# Patient Record
Sex: FEMALE | Race: WHITE
[De-identification: names, ages, dates, MRNs, and addresses within clinical notes are randomized per-mention and may not be internally consistent; named-entity substitution may affect disease eponyms.]

---

## 2018-02-26 ENCOUNTER — LABORATORY RESULT (OUTPATIENT)
Age: 33
End: 2018-02-26

## 2018-02-26 PROBLEM — Z00.00 ENCOUNTER FOR PREVENTIVE HEALTH EXAMINATION: Status: ACTIVE | Noted: 2018-02-26

## 2018-02-27 ENCOUNTER — OUTPATIENT (OUTPATIENT)
Dept: OUTPATIENT SERVICES | Facility: HOSPITAL | Age: 33
LOS: 1 days | Discharge: HOME | End: 2018-02-27

## 2018-02-27 ENCOUNTER — APPOINTMENT (OUTPATIENT)
Dept: OBGYN | Facility: CLINIC | Age: 33
End: 2018-02-27
Payer: COMMERCIAL

## 2018-02-27 ENCOUNTER — LABORATORY RESULT (OUTPATIENT)
Age: 33
End: 2018-02-27

## 2018-02-27 VITALS — HEIGHT: 58 IN | BODY MASS INDEX: 28.34 KG/M2 | WEIGHT: 135 LBS

## 2018-02-27 DIAGNOSIS — N91.2 AMENORRHEA, UNSPECIFIED: ICD-10-CM

## 2018-02-27 LAB
BILIRUB UR QL STRIP: NORMAL
CLARITY UR: CLEAR
GLUCOSE UR-MCNC: NORMAL
HCG UR QL: NORMAL EU/DL
HGB UR QL STRIP.AUTO: NORMAL
KETONES UR-MCNC: NORMAL
LEUKOCYTE ESTERASE UR QL STRIP: NORMAL
NITRITE UR QL STRIP: NORMAL
PH UR STRIP: 7
PROT UR STRIP-MCNC: NORMAL
SP GR UR STRIP: 1

## 2018-02-27 PROCEDURE — 76830 TRANSVAGINAL US NON-OB: CPT

## 2018-02-27 PROCEDURE — 81003 URINALYSIS AUTO W/O SCOPE: CPT | Mod: QW

## 2018-02-27 PROCEDURE — 99213 OFFICE O/P EST LOW 20 MIN: CPT | Mod: 25

## 2018-03-08 ENCOUNTER — APPOINTMENT (OUTPATIENT)
Dept: OBGYN | Facility: CLINIC | Age: 33
End: 2018-03-08
Payer: COMMERCIAL

## 2018-03-08 VITALS — BODY MASS INDEX: 28.34 KG/M2 | HEIGHT: 58 IN | WEIGHT: 135 LBS

## 2018-03-08 PROCEDURE — 76830 TRANSVAGINAL US NON-OB: CPT

## 2018-03-08 PROCEDURE — 99213 OFFICE O/P EST LOW 20 MIN: CPT | Mod: 25

## 2018-04-09 ENCOUNTER — APPOINTMENT (OUTPATIENT)
Dept: OBGYN | Facility: CLINIC | Age: 33
End: 2018-04-09
Payer: COMMERCIAL

## 2018-04-09 ENCOUNTER — NON-APPOINTMENT (OUTPATIENT)
Age: 33
End: 2018-04-09

## 2018-04-09 VITALS — WEIGHT: 141 LBS | BODY MASS INDEX: 29.6 KG/M2 | HEIGHT: 58 IN

## 2018-04-09 LAB
BILIRUB UR QL STRIP: NORMAL
CLARITY UR: CLEAR
GLUCOSE UR-MCNC: NORMAL
HCG UR QL: NORMAL EU/DL
HGB UR QL STRIP.AUTO: NORMAL
KETONES UR-MCNC: NORMAL
LEUKOCYTE ESTERASE UR QL STRIP: NORMAL
NITRITE UR QL STRIP: NORMAL
PH UR STRIP: NORMAL
PROT UR STRIP-MCNC: NORMAL
SP GR UR STRIP: NORMAL

## 2018-04-09 PROCEDURE — 0502F SUBSEQUENT PRENATAL CARE: CPT

## 2018-04-14 ENCOUNTER — LABORATORY RESULT (OUTPATIENT)
Age: 33
End: 2018-04-14

## 2018-04-14 ENCOUNTER — OUTPATIENT (OUTPATIENT)
Dept: OUTPATIENT SERVICES | Facility: HOSPITAL | Age: 33
LOS: 1 days | Discharge: HOME | End: 2018-04-14

## 2018-04-14 DIAGNOSIS — Z34.00 ENCOUNTER FOR SUPERVISION OF NORMAL FIRST PREGNANCY, UNSPECIFIED TRIMESTER: ICD-10-CM

## 2018-04-16 ENCOUNTER — APPOINTMENT (OUTPATIENT)
Dept: ANTEPARTUM | Facility: CLINIC | Age: 33
End: 2018-04-16

## 2018-04-16 ENCOUNTER — OUTPATIENT (OUTPATIENT)
Dept: OUTPATIENT SERVICES | Facility: HOSPITAL | Age: 33
LOS: 1 days | Discharge: HOME | End: 2018-04-16

## 2018-04-24 ENCOUNTER — NON-APPOINTMENT (OUTPATIENT)
Age: 33
End: 2018-04-24

## 2018-04-30 ENCOUNTER — TRANSCRIPTION ENCOUNTER (OUTPATIENT)
Age: 33
End: 2018-04-30

## 2018-04-30 DIAGNOSIS — Z36.82 ENCOUNTER FOR ANTENATAL SCREENING FOR NUCHAL TRANSLUCENCY: ICD-10-CM

## 2018-05-02 ENCOUNTER — NON-APPOINTMENT (OUTPATIENT)
Age: 33
End: 2018-05-02

## 2018-05-02 ENCOUNTER — APPOINTMENT (OUTPATIENT)
Dept: OBGYN | Facility: CLINIC | Age: 33
End: 2018-05-02
Payer: COMMERCIAL

## 2018-05-02 VITALS — HEIGHT: 58 IN | BODY MASS INDEX: 30.02 KG/M2 | WEIGHT: 143 LBS

## 2018-05-02 PROCEDURE — 0502F SUBSEQUENT PRENATAL CARE: CPT

## 2018-05-10 ENCOUNTER — OUTPATIENT (OUTPATIENT)
Dept: OUTPATIENT SERVICES | Facility: HOSPITAL | Age: 33
LOS: 1 days | Discharge: HOME | End: 2018-05-10

## 2018-05-10 DIAGNOSIS — Z34.00 ENCOUNTER FOR SUPERVISION OF NORMAL FIRST PREGNANCY, UNSPECIFIED TRIMESTER: ICD-10-CM

## 2018-05-14 ENCOUNTER — NON-APPOINTMENT (OUTPATIENT)
Age: 33
End: 2018-05-14

## 2018-05-17 ENCOUNTER — NON-APPOINTMENT (OUTPATIENT)
Age: 33
End: 2018-05-17

## 2018-05-17 ENCOUNTER — APPOINTMENT (OUTPATIENT)
Dept: OBGYN | Facility: CLINIC | Age: 33
End: 2018-05-17
Payer: COMMERCIAL

## 2018-05-17 VITALS — SYSTOLIC BLOOD PRESSURE: 126 MMHG | WEIGHT: 143 LBS | BODY MASS INDEX: 29.89 KG/M2 | DIASTOLIC BLOOD PRESSURE: 76 MMHG

## 2018-05-17 LAB
GLUCOSE UR-MCNC: NORMAL
HGB UR QL STRIP.AUTO: NORMAL
KETONES UR-MCNC: NORMAL
LEUKOCYTE ESTERASE UR QL STRIP: NORMAL
NITRITE UR QL STRIP: NORMAL
PROT UR STRIP-MCNC: NORMAL

## 2018-05-17 PROCEDURE — 99213 OFFICE O/P EST LOW 20 MIN: CPT

## 2018-06-04 ENCOUNTER — APPOINTMENT (OUTPATIENT)
Dept: ANTEPARTUM | Facility: CLINIC | Age: 33
End: 2018-06-04

## 2018-06-04 ENCOUNTER — OUTPATIENT (OUTPATIENT)
Dept: OUTPATIENT SERVICES | Facility: HOSPITAL | Age: 33
LOS: 1 days | Discharge: HOME | End: 2018-06-04

## 2018-06-05 ENCOUNTER — NON-APPOINTMENT (OUTPATIENT)
Age: 33
End: 2018-06-05

## 2018-06-05 DIAGNOSIS — O35.8XX1 MATERNAL CARE FOR OTHER (SUSPECTED) FETAL ABNORMALITY AND DAMAGE, FETUS 1: ICD-10-CM

## 2018-06-05 DIAGNOSIS — Z3A.19 19 WEEKS GESTATION OF PREGNANCY: ICD-10-CM

## 2018-06-05 DIAGNOSIS — Z03.73 ENCOUNTER FOR SUSPECTED FETAL ANOMALY RULED OUT: ICD-10-CM

## 2018-06-07 ENCOUNTER — NON-APPOINTMENT (OUTPATIENT)
Age: 33
End: 2018-06-07

## 2018-06-07 ENCOUNTER — APPOINTMENT (OUTPATIENT)
Dept: OBGYN | Facility: CLINIC | Age: 33
End: 2018-06-07
Payer: COMMERCIAL

## 2018-06-07 VITALS — BODY MASS INDEX: 31.49 KG/M2 | WEIGHT: 150 LBS | HEIGHT: 58 IN

## 2018-06-07 PROCEDURE — 0502F SUBSEQUENT PRENATAL CARE: CPT

## 2018-06-27 ENCOUNTER — NON-APPOINTMENT (OUTPATIENT)
Age: 33
End: 2018-06-27

## 2018-06-27 ENCOUNTER — APPOINTMENT (OUTPATIENT)
Dept: OBGYN | Facility: CLINIC | Age: 33
End: 2018-06-27
Payer: COMMERCIAL

## 2018-06-27 VITALS — WEIGHT: 155 LBS | SYSTOLIC BLOOD PRESSURE: 110 MMHG | BODY MASS INDEX: 32.4 KG/M2 | DIASTOLIC BLOOD PRESSURE: 70 MMHG

## 2018-06-27 PROCEDURE — 0502F SUBSEQUENT PRENATAL CARE: CPT

## 2018-07-09 ENCOUNTER — LABORATORY RESULT (OUTPATIENT)
Age: 33
End: 2018-07-09

## 2018-07-09 ENCOUNTER — OUTPATIENT (OUTPATIENT)
Dept: OUTPATIENT SERVICES | Facility: HOSPITAL | Age: 33
LOS: 1 days | Discharge: HOME | End: 2018-07-09

## 2018-07-09 DIAGNOSIS — Z34.00 ENCOUNTER FOR SUPERVISION OF NORMAL FIRST PREGNANCY, UNSPECIFIED TRIMESTER: ICD-10-CM

## 2018-07-11 ENCOUNTER — NON-APPOINTMENT (OUTPATIENT)
Age: 33
End: 2018-07-11

## 2018-07-23 ENCOUNTER — APPOINTMENT (OUTPATIENT)
Dept: OBGYN | Facility: CLINIC | Age: 33
End: 2018-07-23
Payer: COMMERCIAL

## 2018-07-23 VITALS — HEIGHT: 58 IN | WEIGHT: 164 LBS | BODY MASS INDEX: 34.43 KG/M2

## 2018-07-23 PROCEDURE — 0502F SUBSEQUENT PRENATAL CARE: CPT

## 2018-08-11 ENCOUNTER — NON-APPOINTMENT (OUTPATIENT)
Age: 33
End: 2018-08-11

## 2018-08-15 ENCOUNTER — APPOINTMENT (OUTPATIENT)
Dept: OBGYN | Facility: CLINIC | Age: 33
End: 2018-08-15
Payer: COMMERCIAL

## 2018-08-15 ENCOUNTER — NON-APPOINTMENT (OUTPATIENT)
Age: 33
End: 2018-08-15

## 2018-08-15 VITALS — BODY MASS INDEX: 35.95 KG/M2 | DIASTOLIC BLOOD PRESSURE: 66 MMHG | WEIGHT: 172 LBS | SYSTOLIC BLOOD PRESSURE: 120 MMHG

## 2018-08-15 PROCEDURE — 0502F SUBSEQUENT PRENATAL CARE: CPT

## 2018-08-30 ENCOUNTER — NON-APPOINTMENT (OUTPATIENT)
Age: 33
End: 2018-08-30

## 2018-08-30 ENCOUNTER — APPOINTMENT (OUTPATIENT)
Dept: OBGYN | Facility: CLINIC | Age: 33
End: 2018-08-30
Payer: COMMERCIAL

## 2018-08-30 PROCEDURE — 76818 FETAL BIOPHYS PROFILE W/NST: CPT

## 2018-08-31 ENCOUNTER — OUTPATIENT (OUTPATIENT)
Dept: OUTPATIENT SERVICES | Facility: HOSPITAL | Age: 33
LOS: 1 days | Discharge: HOME | End: 2018-08-31

## 2018-08-31 VITALS — HEART RATE: 90 BPM | DIASTOLIC BLOOD PRESSURE: 69 MMHG | SYSTOLIC BLOOD PRESSURE: 129 MMHG

## 2018-08-31 VITALS — SYSTOLIC BLOOD PRESSURE: 133 MMHG | HEART RATE: 102 BPM | DIASTOLIC BLOOD PRESSURE: 67 MMHG

## 2018-08-31 DIAGNOSIS — Z98.82 BREAST IMPLANT STATUS: Chronic | ICD-10-CM

## 2018-08-31 LAB
APTT BLD: 26.5 SEC — LOW (ref 27–39.2)
FIBRINOGEN PPP-MCNC: >700 MG/DL — HIGH (ref 204.4–570.6)
HCT VFR BLD CALC: 30.4 % — LOW (ref 37–47)
HGB BLD-MCNC: 10.5 G/DL — LOW (ref 12–16)
INR BLD: 0.94 RATIO — SIGNIFICANT CHANGE UP (ref 0.65–1.3)
MCHC RBC-ENTMCNC: 31.1 PG — HIGH (ref 27–31)
MCHC RBC-ENTMCNC: 34.5 G/DL — SIGNIFICANT CHANGE UP (ref 32–37)
MCV RBC AUTO: 89.9 FL — SIGNIFICANT CHANGE UP (ref 81–99)
NRBC # BLD: 0 /100 WBCS — SIGNIFICANT CHANGE UP (ref 0–0)
PLATELET # BLD AUTO: 247 K/UL — SIGNIFICANT CHANGE UP (ref 130–400)
PROTHROM AB SERPL-ACNC: 10.2 SEC — SIGNIFICANT CHANGE UP (ref 9.95–12.87)
RBC # BLD: 3.38 M/UL — LOW (ref 4.2–5.4)
RBC # FLD: 13.1 % — SIGNIFICANT CHANGE UP (ref 11.5–14.5)
WBC # BLD: 10.22 K/UL — SIGNIFICANT CHANGE UP (ref 4.8–10.8)
WBC # FLD AUTO: 10.22 K/UL — SIGNIFICANT CHANGE UP (ref 4.8–10.8)

## 2018-08-31 RX ORDER — SODIUM CHLORIDE 9 MG/ML
1000 INJECTION, SOLUTION INTRAVENOUS
Qty: 0 | Refills: 0 | Status: DISCONTINUED | OUTPATIENT
Start: 2018-08-31 | End: 2018-09-15
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
## 2018-08-31 NOTE — OB PROVIDER TRIAGE NOTE - ADDITIONAL INSTRUCTIONS
If you have contractions every few minutes, vaginal bleeding, leakage of fluid or you don't feel the baby move please call your doctor or come to labor and delivery.
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
## 2018-08-31 NOTE — OB PROVIDER TRIAGE NOTE - NSHPPHYSICALEXAM_GEN_ALL_CORE
Vital Signs Last 24 Hrs  T(C): 36.4 (31 Aug 2018 10:22), Max: 36.4 (31 Aug 2018 10:22)  T(F): 97.6 (31 Aug 2018 10:22), Max: 97.6 (31 Aug 2018 10:22)  HR: 102 (31 Aug 2018 10:22) (102 - 102)  BP: 133/67 (31 Aug 2018 10:22) (133/67 - 133/67)  RR: 18 (31 Aug 2018 10:22) (18 - 18)    GEN: NAD, AAOX3  abd: soft, gravid, nontender, no r/g/r, no palpable ctx  EFM: 140/mod/accels+  toco: no ctx  SVE:  speculum: Vital Signs Last 24 Hrs  T(C): 36.4 (31 Aug 2018 10:22), Max: 36.4 (31 Aug 2018 10:22)  T(F): 97.6 (31 Aug 2018 10:22), Max: 97.6 (31 Aug 2018 10:22)  HR: 102 (31 Aug 2018 10:22) (102 - 102)  BP: 133/67 (31 Aug 2018 10:22) (133/67 - 133/67)  RR: 18 (31 Aug 2018 10:22) (18 - 18)    GEN: NAD, AAOX3  abd: soft, gravid, nontender, no r/g/r, no palpable ctx  EFM: 140/mod/accels+  toco: occasional ctx  SVE: c/l/p  speculum: closed cervix, no bleeding or discharge

## 2018-08-31 NOTE — OB PROVIDER TRIAGE NOTE - NSHPLABSRESULTS_GEN_ALL_CORE
Hbs Ag NR  RPR NR  varicella immune  rubella immune  B positive, antibody screen negative  HIV NR  GCT 90    Sonograms:  8/30/18 - breech, posterior placenta, MVP 4 cm, EFW 5-11  19w2d - cephalic, 3VC, posterior placenta, MVP 58mm,  grams, normal anatomy  12w2d - viable IUP

## 2018-08-31 NOTE — OB PROVIDER TRIAGE NOTE - NS_LMP_OBGYN_ALL_OB_DT
20-Jan-2018 Complex Repair And Single Advancement Flap Text: The defect edges were debeveled with a #15 scalpel blade.  The primary defect was closed partially with a complex linear closure.  Given the location of the remaining defect, shape of the defect and the proximity to free margins a single advancement flap was deemed most appropriate for complete closure of the defect.  Using a sterile surgical marker, an appropriate advancement flap was drawn incorporating the defect and placing the expected incisions within the relaxed skin tension lines where possible.    The area thus outlined was incised deep to adipose tissue with a #15 scalpel blade.  The skin margins were undermined to an appropriate distance in all directions utilizing iris scissors.

## 2018-09-05 ENCOUNTER — NON-APPOINTMENT (OUTPATIENT)
Age: 33
End: 2018-09-05

## 2018-09-05 ENCOUNTER — APPOINTMENT (OUTPATIENT)
Dept: OBGYN | Facility: CLINIC | Age: 33
End: 2018-09-05
Payer: COMMERCIAL

## 2018-09-05 VITALS — HEIGHT: 58 IN | WEIGHT: 179 LBS | BODY MASS INDEX: 37.57 KG/M2

## 2018-09-05 PROBLEM — D64.9 ANEMIA, UNSPECIFIED: Chronic | Status: ACTIVE | Noted: 2018-08-31

## 2018-09-05 PROCEDURE — 0502F SUBSEQUENT PRENATAL CARE: CPT

## 2018-09-17 ENCOUNTER — APPOINTMENT (OUTPATIENT)
Dept: OBGYN | Facility: CLINIC | Age: 33
End: 2018-09-17
Payer: COMMERCIAL

## 2018-09-17 ENCOUNTER — NON-APPOINTMENT (OUTPATIENT)
Age: 33
End: 2018-09-17

## 2018-09-17 VITALS — WEIGHT: 178 LBS | SYSTOLIC BLOOD PRESSURE: 120 MMHG | BODY MASS INDEX: 37.2 KG/M2 | DIASTOLIC BLOOD PRESSURE: 78 MMHG

## 2018-09-17 PROCEDURE — 0502F SUBSEQUENT PRENATAL CARE: CPT

## 2018-09-24 ENCOUNTER — APPOINTMENT (OUTPATIENT)
Dept: OBGYN | Facility: CLINIC | Age: 33
End: 2018-09-24
Payer: COMMERCIAL

## 2018-09-24 ENCOUNTER — OUTPATIENT (OUTPATIENT)
Dept: OUTPATIENT SERVICES | Facility: HOSPITAL | Age: 33
LOS: 1 days | Discharge: HOME | End: 2018-09-24

## 2018-09-24 ENCOUNTER — NON-APPOINTMENT (OUTPATIENT)
Age: 33
End: 2018-09-24

## 2018-09-24 VITALS — WEIGHT: 178 LBS | BODY MASS INDEX: 37.36 KG/M2 | HEIGHT: 58 IN

## 2018-09-24 DIAGNOSIS — Z98.82 BREAST IMPLANT STATUS: Chronic | ICD-10-CM

## 2018-09-24 DIAGNOSIS — Z34.00 ENCOUNTER FOR SUPERVISION OF NORMAL FIRST PREGNANCY, UNSPECIFIED TRIMESTER: ICD-10-CM

## 2018-09-24 PROCEDURE — 0502F SUBSEQUENT PRENATAL CARE: CPT

## 2018-09-26 LAB
GROUP B BETA STREP DNA (PCR): DETECTED
GROUP B BETA STREP INTERPRETATION: SIGNIFICANT CHANGE UP
SOURCE GROUP B STREP: SIGNIFICANT CHANGE UP

## 2018-09-28 ENCOUNTER — NON-APPOINTMENT (OUTPATIENT)
Age: 33
End: 2018-09-28

## 2018-09-28 ENCOUNTER — APPOINTMENT (OUTPATIENT)
Dept: OBGYN | Facility: CLINIC | Age: 33
End: 2018-09-28
Payer: COMMERCIAL

## 2018-09-28 PROCEDURE — 93975 VASCULAR STUDY: CPT

## 2018-09-28 PROCEDURE — 76819 FETAL BIOPHYS PROFIL W/O NST: CPT

## 2018-09-29 LAB
-  CLINDAMYCIN: SIGNIFICANT CHANGE UP
-  PENICILLIN: SIGNIFICANT CHANGE UP
-  VANCOMYCIN: SIGNIFICANT CHANGE UP
BILIRUB UR QL STRIP: NORMAL
CLARITY UR: CLEAR
GLUCOSE UR-MCNC: NORMAL
HCG UR QL: NORMAL EU/DL
HGB UR QL STRIP.AUTO: NORMAL
KETONES UR-MCNC: NORMAL
LEUKOCYTE ESTERASE UR QL STRIP: NORMAL
METHOD TYPE: SIGNIFICANT CHANGE UP
NITRITE UR QL STRIP: NORMAL
ORGANISM # SPEC MICROSCOPIC CNT: SIGNIFICANT CHANGE UP
ORGANISM # SPEC MICROSCOPIC CNT: SIGNIFICANT CHANGE UP
PH UR STRIP: 7
PROT UR STRIP-MCNC: NORMAL
SP GR UR STRIP: 1.01
SPECIMEN SOURCE: SIGNIFICANT CHANGE UP

## 2018-10-03 ENCOUNTER — NON-APPOINTMENT (OUTPATIENT)
Age: 33
End: 2018-10-03

## 2018-10-03 ENCOUNTER — APPOINTMENT (OUTPATIENT)
Dept: OBGYN | Facility: CLINIC | Age: 33
End: 2018-10-03
Payer: COMMERCIAL

## 2018-10-03 VITALS — SYSTOLIC BLOOD PRESSURE: 130 MMHG | BODY MASS INDEX: 38.25 KG/M2 | WEIGHT: 183 LBS | DIASTOLIC BLOOD PRESSURE: 90 MMHG

## 2018-10-03 PROCEDURE — 0502F SUBSEQUENT PRENATAL CARE: CPT

## 2018-10-04 ENCOUNTER — OUTPATIENT (OUTPATIENT)
Dept: OUTPATIENT SERVICES | Facility: HOSPITAL | Age: 33
LOS: 1 days | Discharge: HOME | End: 2018-10-04

## 2018-10-04 VITALS — HEART RATE: 91 BPM | DIASTOLIC BLOOD PRESSURE: 88 MMHG | SYSTOLIC BLOOD PRESSURE: 142 MMHG

## 2018-10-04 VITALS — DIASTOLIC BLOOD PRESSURE: 53 MMHG | HEART RATE: 81 BPM | SYSTOLIC BLOOD PRESSURE: 101 MMHG

## 2018-10-04 DIAGNOSIS — Z98.82 BREAST IMPLANT STATUS: Chronic | ICD-10-CM

## 2018-10-04 LAB
ALBUMIN SERPL ELPH-MCNC: 3.8 G/DL — SIGNIFICANT CHANGE UP (ref 3.5–5.2)
ALP SERPL-CCNC: 109 U/L — SIGNIFICANT CHANGE UP (ref 30–115)
ALT FLD-CCNC: 12 U/L — SIGNIFICANT CHANGE UP (ref 0–41)
ANION GAP SERPL CALC-SCNC: 18 MMOL/L — HIGH (ref 7–14)
APPEARANCE UR: CLEAR — SIGNIFICANT CHANGE UP
AST SERPL-CCNC: 16 U/L — SIGNIFICANT CHANGE UP (ref 0–41)
BILIRUB SERPL-MCNC: <0.2 MG/DL — SIGNIFICANT CHANGE UP (ref 0.2–1.2)
BILIRUB UR-MCNC: NEGATIVE — SIGNIFICANT CHANGE UP
BLD GP AB SCN SERPL QL: SIGNIFICANT CHANGE UP
BUN SERPL-MCNC: 11 MG/DL — SIGNIFICANT CHANGE UP (ref 10–20)
CALCIUM SERPL-MCNC: 9.7 MG/DL — SIGNIFICANT CHANGE UP (ref 8.5–10.1)
CHLORIDE SERPL-SCNC: 99 MMOL/L — SIGNIFICANT CHANGE UP (ref 98–110)
CO2 SERPL-SCNC: 20 MMOL/L — SIGNIFICANT CHANGE UP (ref 17–32)
COLOR SPEC: YELLOW — SIGNIFICANT CHANGE UP
CREAT ?TM UR-MCNC: 35 MG/DL — SIGNIFICANT CHANGE UP
CREAT SERPL-MCNC: 0.5 MG/DL — LOW (ref 0.7–1.5)
DIFF PNL FLD: NEGATIVE — SIGNIFICANT CHANGE UP
GLUCOSE SERPL-MCNC: 83 MG/DL — SIGNIFICANT CHANGE UP (ref 70–99)
GLUCOSE UR QL: NEGATIVE — SIGNIFICANT CHANGE UP
HCT VFR BLD CALC: 31.3 % — LOW (ref 37–47)
HGB BLD-MCNC: 10.8 G/DL — LOW (ref 12–16)
KETONES UR-MCNC: NEGATIVE — SIGNIFICANT CHANGE UP
LDH SERPL L TO P-CCNC: 196 — SIGNIFICANT CHANGE UP (ref 50–242)
LEUKOCYTE ESTERASE UR-ACNC: NEGATIVE — SIGNIFICANT CHANGE UP
MCHC RBC-ENTMCNC: 31 PG — SIGNIFICANT CHANGE UP (ref 27–31)
MCHC RBC-ENTMCNC: 34.5 G/DL — SIGNIFICANT CHANGE UP (ref 32–37)
MCV RBC AUTO: 89.9 FL — SIGNIFICANT CHANGE UP (ref 81–99)
NITRITE UR-MCNC: NEGATIVE — SIGNIFICANT CHANGE UP
NRBC # BLD: 0 /100 WBCS — SIGNIFICANT CHANGE UP (ref 0–0)
PH UR: 6.5 — SIGNIFICANT CHANGE UP (ref 5–8)
PLATELET # BLD AUTO: 207 K/UL — SIGNIFICANT CHANGE UP (ref 130–400)
POTASSIUM SERPL-MCNC: 4 MMOL/L — SIGNIFICANT CHANGE UP (ref 3.5–5)
POTASSIUM SERPL-SCNC: 4 MMOL/L — SIGNIFICANT CHANGE UP (ref 3.5–5)
PROT ?TM UR-MCNC: <5 MG/DLG/24H — SIGNIFICANT CHANGE UP
PROT SERPL-MCNC: 6.1 G/DL — SIGNIFICANT CHANGE UP (ref 6–8)
PROT UR-MCNC: NEGATIVE — SIGNIFICANT CHANGE UP
PROT/CREAT UR-RTO: <0.1 RATIO — SIGNIFICANT CHANGE UP (ref 0–0.2)
RBC # BLD: 3.48 M/UL — LOW (ref 4.2–5.4)
RBC # FLD: 13.2 % — SIGNIFICANT CHANGE UP (ref 11.5–14.5)
SODIUM SERPL-SCNC: 137 MMOL/L — SIGNIFICANT CHANGE UP (ref 135–146)
SP GR SPEC: <=1.005 — SIGNIFICANT CHANGE UP (ref 1.01–1.03)
TYPE + AB SCN PNL BLD: SIGNIFICANT CHANGE UP
URATE SERPL-MCNC: 5.2 MG/DL — SIGNIFICANT CHANGE UP (ref 2.5–7)
UROBILINOGEN FLD QL: 0.2 — SIGNIFICANT CHANGE UP (ref 0.2–0.2)
WBC # BLD: 9.66 K/UL — SIGNIFICANT CHANGE UP (ref 4.8–10.8)
WBC # FLD AUTO: 9.66 K/UL — SIGNIFICANT CHANGE UP (ref 4.8–10.8)

## 2018-10-04 RX ORDER — SODIUM CHLORIDE 9 MG/ML
3 INJECTION INTRAMUSCULAR; INTRAVENOUS; SUBCUTANEOUS EVERY 8 HOURS
Qty: 0 | Refills: 0 | Status: DISCONTINUED | OUTPATIENT
Start: 2018-10-04 | End: 2018-10-19

## 2018-10-04 NOTE — OB PROVIDER TRIAGE NOTE - NSHPPHYSICALEXAM_GEN_ALL_CORE
PHYSICAL EXAM:  T(F): 98.4 (10-04 @ 06:08)  HR: 96 (10-04 @ 06:52)  BP: 131/73 (10-04 @ 06:52)  RR: 16 (10-04 @ 06:08)  Constitutional: AAOx3, NAD  Abdomen: Soft, gravid, nontender, no palpable ctx  EFM: 130, mod yina, +accel  [place]: q1-2mins, irritable  SVE: C/L/P  Udip: neg  EFW: 3200gm

## 2018-10-04 NOTE — OB PROVIDER TRIAGE NOTE - PMH
Anemia    Ectopic pregnancy, unspecified location, unspecified whether intrauterine pregnancy present  tx with methotrexate

## 2018-10-04 NOTE — OB PROVIDER TRIAGE NOTE - HISTORY OF PRESENT ILLNESS
33  at 37w2d by LMP c/w first trimester sono presents for eval of elevated BP. Had /80 at office yesterday, and then at home took it again 140/80, then 150/80, then came in. She denies ctx, lof, or vb. Feels good fetal movement. Denies hematuria, dysuria, constipation, diarrhea, nausea, vomiting, chest pain, or SOB. Denies headache, visual changes, shortness of breath, chest pain, right upper quadrant pain, nausea, or vomiting. Uncomplicated pregnancy, last examined yesterday and was closed.     ObHx: ectopic tx with methotrexate x1  GynHx: Denies history of STIs, abnormal pap, ovarian cysts, or fibroids

## 2018-10-04 NOTE — OB PROVIDER TRIAGE NOTE - ADDITIONAL INSTRUCTIONS
- DC home per PMD  - F/u Uprcr  - Preeclamptic instructions given  - F/u with OBGYN at next appt   - Labor precautions given
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
## 2018-10-04 NOTE — PROGRESS NOTE ADULT - SUBJECTIVE AND OBJECTIVE BOX
PGY 3 Note:    Patient seen at bedside feeling well at this time. Denies headache, vision changes, chest pain, shortness of breath, right upper or epigastric abdominal pain, new swelling. Denies ctx, vb, lof. Good FM.    Vital Signs Last 24 Hrs  T(C): 36.9 (04 Oct 2018 06:08), Max: 36.9 (04 Oct 2018 06:08)  T(F): 98.4 (04 Oct 2018 06:08), Max: 98.4 (04 Oct 2018 06:08)  HR: 81 (04 Oct 2018 10:19) (75 - 96)  BP: 101/53 (04 Oct 2018 10:19) (94/51 - 142/88)  RR: 16 (04 Oct 2018 06:08) (16 - 16)    Gen: NAD, A&OX3  Heart: S1S2, RRR  Lungs: CTABL  Abd: Gravid, soft, NT, no palpable ctx  SVE: Deferred, last exam C/L/P  Ext: No edema of calf tenderness  Neuro: DTRs 2+ UE BL    Labs:                          10.8   9.66  )-----------( 207      ( 04 Oct 2018 06:56 )             31.3   10-04    137  |  99  |  11  ----------------------------<  83  4.0   |  20  |  0.5<L>    Ca    9.7      04 Oct 2018 06:56    TPro  6.1  /  Alb  3.8  /  TBili  <0.2  /  DBili  x   /  AST  16  /  ALT  12  /  AlkPhos  109  10-04    Lactate Dehydrogenase, Serum: 196 (10.04.18 @ 06:56)    Uric Acid, Serum: 5.2 mg/dL (10.04.18 @ 06:56)    Urinalysis Basic - ( 04 Oct 2018 06:56 )    Color: Yellow / Appearance: Clear / SG: <=1.005 / pH: x  Gluc: x / Ketone: Negative  / Bili: Negative / Urobili: 0.2   Blood: x / Protein: Negative / Nitrite: Negative   Leuk Esterase: Negative / RBC: x / WBC x   Sq Epi: x / Non Sq Epi: x / Bacteria: x    Type + Screen: B POS (10.04.18 @ 06:56)    Uprcr pending    MEDICATIONS  (STANDING):    MEDICATIONS  (PRN):  sodium chloride 0.9% lock flush 3 milliLiter(s) IV Push every 8 hours PRN keep it open      BPP 8/8 PGY 3 Note:    Patient seen at bedside feeling well at this time. Denies headache, vision changes, chest pain, shortness of breath, right upper or epigastric abdominal pain, new swelling. Denies ctx, vb, lof. Good FM.    Vital Signs Last 24 Hrs  T(C): 36.9 (04 Oct 2018 06:08), Max: 36.9 (04 Oct 2018 06:08)  T(F): 98.4 (04 Oct 2018 06:08), Max: 98.4 (04 Oct 2018 06:08)  HR: 81 (04 Oct 2018 10:19) (75 - 96)  BP: 101/53 (04 Oct 2018 10:19) (94/51 - 142/88)  RR: 16 (04 Oct 2018 06:08) (16 - 16)    Gen: NAD, A&OX3  Heart: S1S2, RRR  Lungs: CTABL  Abd: Gravid, soft, NT, no palpable ctx, no RUQ/epigastric tenderness  SVE: Deferred, last exam C/L/P  Ext: No edema of calf tenderness  Neuro: DTRs 2+ UE BL  EFM: 140/mod/+accels  Rena Lara: Irritable, not palpated or felt by patient    Labs:                          10.8   9.66  )-----------( 207      ( 04 Oct 2018 06:56 )             31.3   10-04    137  |  99  |  11  ----------------------------<  83  4.0   |  20  |  0.5<L>    Ca    9.7      04 Oct 2018 06:56    TPro  6.1  /  Alb  3.8  /  TBili  <0.2  /  DBili  x   /  AST  16  /  ALT  12  /  AlkPhos  109  10-04    Lactate Dehydrogenase, Serum: 196 (10.04.18 @ 06:56)    Uric Acid, Serum: 5.2 mg/dL (10.04.18 @ 06:56)    Urinalysis Basic - ( 04 Oct 2018 06:56 )    Color: Yellow / Appearance: Clear / SG: <=1.005 / pH: x  Gluc: x / Ketone: Negative  / Bili: Negative / Urobili: 0.2   Blood: x / Protein: Negative / Nitrite: Negative   Leuk Esterase: Negative / RBC: x / WBC x   Sq Epi: x / Non Sq Epi: x / Bacteria: x    Type + Screen: B POS (10.04.18 @ 06:56)    Uprcr pending    MEDICATIONS  (STANDING):    MEDICATIONS  (PRN):  sodium chloride 0.9% lock flush 3 milliLiter(s) IV Push every 8 hours PRN keep it open      BPP 8/8

## 2018-10-08 ENCOUNTER — APPOINTMENT (OUTPATIENT)
Dept: OBGYN | Facility: CLINIC | Age: 33
End: 2018-10-08
Payer: COMMERCIAL

## 2018-10-08 VITALS — BODY MASS INDEX: 38.62 KG/M2 | WEIGHT: 184 LBS | HEIGHT: 58 IN

## 2018-10-08 PROCEDURE — 0502F SUBSEQUENT PRENATAL CARE: CPT

## 2018-10-13 ENCOUNTER — NON-APPOINTMENT (OUTPATIENT)
Age: 33
End: 2018-10-13

## 2018-10-15 ENCOUNTER — NON-APPOINTMENT (OUTPATIENT)
Age: 33
End: 2018-10-15

## 2018-10-15 ENCOUNTER — APPOINTMENT (OUTPATIENT)
Dept: OBGYN | Facility: CLINIC | Age: 33
End: 2018-10-15
Payer: COMMERCIAL

## 2018-10-15 VITALS — DIASTOLIC BLOOD PRESSURE: 70 MMHG | WEIGHT: 184 LBS | SYSTOLIC BLOOD PRESSURE: 126 MMHG | BODY MASS INDEX: 38.46 KG/M2

## 2018-10-15 PROCEDURE — 0502F SUBSEQUENT PRENATAL CARE: CPT

## 2018-10-22 ENCOUNTER — OUTPATIENT (OUTPATIENT)
Dept: OUTPATIENT SERVICES | Facility: HOSPITAL | Age: 33
LOS: 1 days | Discharge: HOME | End: 2018-10-22

## 2018-10-22 ENCOUNTER — APPOINTMENT (OUTPATIENT)
Dept: OBGYN | Facility: CLINIC | Age: 33
End: 2018-10-22

## 2018-10-22 DIAGNOSIS — Z01.818 ENCOUNTER FOR OTHER PREPROCEDURAL EXAMINATION: ICD-10-CM

## 2018-10-22 DIAGNOSIS — Z98.82 BREAST IMPLANT STATUS: Chronic | ICD-10-CM

## 2018-10-22 PROBLEM — O00.90 UNSPECIFIED ECTOPIC PREGNANCY WITHOUT INTRAUTERINE PREGNANCY: Chronic | Status: ACTIVE | Noted: 2018-10-04

## 2018-10-22 LAB
ALBUMIN SERPL ELPH-MCNC: 3.6 G/DL — SIGNIFICANT CHANGE UP (ref 3.5–5.2)
ALP SERPL-CCNC: 134 U/L — HIGH (ref 30–115)
ALT FLD-CCNC: 14 U/L — SIGNIFICANT CHANGE UP (ref 0–41)
ANION GAP SERPL CALC-SCNC: 13 MMOL/L — SIGNIFICANT CHANGE UP (ref 7–14)
APPEARANCE UR: CLEAR — SIGNIFICANT CHANGE UP
APTT BLD: 27.2 SEC — SIGNIFICANT CHANGE UP (ref 27–39.2)
AST SERPL-CCNC: 18 U/L — SIGNIFICANT CHANGE UP (ref 0–41)
BASOPHILS # BLD AUTO: 0.05 K/UL — SIGNIFICANT CHANGE UP (ref 0–0.2)
BASOPHILS NFR BLD AUTO: 0.6 % — SIGNIFICANT CHANGE UP (ref 0–1)
BILIRUB SERPL-MCNC: <0.2 MG/DL — SIGNIFICANT CHANGE UP (ref 0.2–1.2)
BILIRUB UR-MCNC: NEGATIVE — SIGNIFICANT CHANGE UP
BLD GP AB SCN SERPL QL: SIGNIFICANT CHANGE UP
BUN SERPL-MCNC: 10 MG/DL — SIGNIFICANT CHANGE UP (ref 10–20)
CALCIUM SERPL-MCNC: 9.9 MG/DL — SIGNIFICANT CHANGE UP (ref 8.5–10.1)
CHLORIDE SERPL-SCNC: 96 MMOL/L — LOW (ref 98–110)
CO2 SERPL-SCNC: 23 MMOL/L — SIGNIFICANT CHANGE UP (ref 17–32)
COLOR SPEC: YELLOW — SIGNIFICANT CHANGE UP
CREAT SERPL-MCNC: 0.6 MG/DL — LOW (ref 0.7–1.5)
DIFF PNL FLD: NEGATIVE — SIGNIFICANT CHANGE UP
EOSINOPHIL # BLD AUTO: 0.2 K/UL — SIGNIFICANT CHANGE UP (ref 0–0.7)
EOSINOPHIL NFR BLD AUTO: 2.4 % — SIGNIFICANT CHANGE UP (ref 0–8)
GLUCOSE SERPL-MCNC: 85 MG/DL — SIGNIFICANT CHANGE UP (ref 70–99)
GLUCOSE UR QL: NEGATIVE — SIGNIFICANT CHANGE UP
HCT VFR BLD CALC: 32.8 % — LOW (ref 37–47)
HGB BLD-MCNC: 11.3 G/DL — LOW (ref 12–16)
IMM GRANULOCYTES NFR BLD AUTO: 1.7 % — HIGH (ref 0.1–0.3)
INR BLD: 0.82 RATIO — SIGNIFICANT CHANGE UP (ref 0.65–1.3)
KETONES UR-MCNC: NEGATIVE — SIGNIFICANT CHANGE UP
LEUKOCYTE ESTERASE UR-ACNC: NEGATIVE — SIGNIFICANT CHANGE UP
LYMPHOCYTES # BLD AUTO: 1.7 K/UL — SIGNIFICANT CHANGE UP (ref 1.2–3.4)
LYMPHOCYTES # BLD AUTO: 20.6 % — SIGNIFICANT CHANGE UP (ref 20.5–51.1)
MCHC RBC-ENTMCNC: 31.4 PG — HIGH (ref 27–31)
MCHC RBC-ENTMCNC: 34.5 G/DL — SIGNIFICANT CHANGE UP (ref 32–37)
MCV RBC AUTO: 91.1 FL — SIGNIFICANT CHANGE UP (ref 81–99)
MONOCYTES # BLD AUTO: 0.48 K/UL — SIGNIFICANT CHANGE UP (ref 0.1–0.6)
MONOCYTES NFR BLD AUTO: 5.8 % — SIGNIFICANT CHANGE UP (ref 1.7–9.3)
NEUTROPHILS # BLD AUTO: 5.68 K/UL — SIGNIFICANT CHANGE UP (ref 1.4–6.5)
NEUTROPHILS NFR BLD AUTO: 68.9 % — SIGNIFICANT CHANGE UP (ref 42.2–75.2)
NITRITE UR-MCNC: NEGATIVE — SIGNIFICANT CHANGE UP
NRBC # BLD: 0 /100 WBCS — SIGNIFICANT CHANGE UP (ref 0–0)
PH UR: 7 — SIGNIFICANT CHANGE UP (ref 5–8)
PLATELET # BLD AUTO: 200 K/UL — SIGNIFICANT CHANGE UP (ref 130–400)
POTASSIUM SERPL-MCNC: 4.1 MMOL/L — SIGNIFICANT CHANGE UP (ref 3.5–5)
POTASSIUM SERPL-SCNC: 4.1 MMOL/L — SIGNIFICANT CHANGE UP (ref 3.5–5)
PROT SERPL-MCNC: 6.4 G/DL — SIGNIFICANT CHANGE UP (ref 6–8)
PROT UR-MCNC: NEGATIVE — SIGNIFICANT CHANGE UP
PROTHROM AB SERPL-ACNC: 9.5 SEC — LOW (ref 9.95–12.87)
RBC # BLD: 3.6 M/UL — LOW (ref 4.2–5.4)
RBC # FLD: 13.8 % — SIGNIFICANT CHANGE UP (ref 11.5–14.5)
SODIUM SERPL-SCNC: 132 MMOL/L — LOW (ref 135–146)
SP GR SPEC: 1.02 — SIGNIFICANT CHANGE UP (ref 1.01–1.03)
TYPE + AB SCN PNL BLD: SIGNIFICANT CHANGE UP
UROBILINOGEN FLD QL: 0.2 — SIGNIFICANT CHANGE UP (ref 0.2–0.2)
WBC # BLD: 8.25 K/UL — SIGNIFICANT CHANGE UP (ref 4.8–10.8)
WBC # FLD AUTO: 8.25 K/UL — SIGNIFICANT CHANGE UP (ref 4.8–10.8)

## 2018-10-23 ENCOUNTER — RX RENEWAL (OUTPATIENT)
Age: 33
End: 2018-10-23

## 2018-10-23 ENCOUNTER — INPATIENT (INPATIENT)
Facility: HOSPITAL | Age: 33
LOS: 2 days | Discharge: HOME | End: 2018-10-26
Attending: OBSTETRICS & GYNECOLOGY | Admitting: OBSTETRICS & GYNECOLOGY
Payer: COMMERCIAL

## 2018-10-23 ENCOUNTER — APPOINTMENT (OUTPATIENT)
Dept: OBGYN | Facility: HOSPITAL | Age: 33
End: 2018-10-23

## 2018-10-23 VITALS — HEART RATE: 101 BPM | SYSTOLIC BLOOD PRESSURE: 134 MMHG | DIASTOLIC BLOOD PRESSURE: 85 MMHG

## 2018-10-23 DIAGNOSIS — Z98.82 BREAST IMPLANT STATUS: Chronic | ICD-10-CM

## 2018-10-23 DIAGNOSIS — Z90.89 ACQUIRED ABSENCE OF OTHER ORGANS: Chronic | ICD-10-CM

## 2018-10-23 LAB
HIV 1 & 2 AB SERPL IA.RAPID: SIGNIFICANT CHANGE UP
T PALLIDUM AB TITR SER: NEGATIVE — SIGNIFICANT CHANGE UP

## 2018-10-23 PROCEDURE — 59510 CESAREAN DELIVERY: CPT | Mod: U9

## 2018-10-23 RX ORDER — DOCUSATE SODIUM 100 MG
100 CAPSULE ORAL
Qty: 0 | Refills: 0 | Status: DISCONTINUED | OUTPATIENT
Start: 2018-10-23 | End: 2018-10-23

## 2018-10-23 RX ORDER — OXYTOCIN 10 UNIT/ML
41.67 VIAL (ML) INJECTION
Qty: 20 | Refills: 0 | Status: DISCONTINUED | OUTPATIENT
Start: 2018-10-23 | End: 2018-10-26

## 2018-10-23 RX ORDER — DIPHENHYDRAMINE HCL 50 MG
25 CAPSULE ORAL EVERY 6 HOURS
Qty: 0 | Refills: 0 | Status: DISCONTINUED | OUTPATIENT
Start: 2018-10-23 | End: 2018-10-26

## 2018-10-23 RX ORDER — VALACYCLOVIR 500 MG/1
500 TABLET, FILM COATED ORAL TWICE DAILY
Qty: 60 | Refills: 0 | Status: ACTIVE | COMMUNITY
Start: 2018-09-25 | End: 1900-01-01

## 2018-10-23 RX ORDER — FERROUS SULFATE 325(65) MG
325 TABLET ORAL DAILY
Qty: 0 | Refills: 0 | Status: DISCONTINUED | OUTPATIENT
Start: 2018-10-23 | End: 2018-10-26

## 2018-10-23 RX ORDER — SODIUM CHLORIDE 9 MG/ML
1000 INJECTION, SOLUTION INTRAVENOUS
Qty: 0 | Refills: 0 | Status: DISCONTINUED | OUTPATIENT
Start: 2018-10-23 | End: 2018-10-23

## 2018-10-23 RX ORDER — OXYCODONE AND ACETAMINOPHEN 5; 325 MG/1; MG/1
1 TABLET ORAL
Qty: 0 | Refills: 0 | Status: DISCONTINUED | OUTPATIENT
Start: 2018-10-23 | End: 2018-10-26

## 2018-10-23 RX ORDER — IBUPROFEN 200 MG
600 TABLET ORAL EVERY 6 HOURS
Qty: 0 | Refills: 0 | Status: DISCONTINUED | OUTPATIENT
Start: 2018-10-23 | End: 2018-10-26

## 2018-10-23 RX ORDER — KETOROLAC TROMETHAMINE 30 MG/ML
30 SYRINGE (ML) INJECTION ONCE
Qty: 0 | Refills: 0 | Status: DISCONTINUED | OUTPATIENT
Start: 2018-10-23 | End: 2018-10-23

## 2018-10-23 RX ORDER — SODIUM CHLORIDE 9 MG/ML
1000 INJECTION, SOLUTION INTRAVENOUS
Qty: 0 | Refills: 0 | Status: DISCONTINUED | OUTPATIENT
Start: 2018-10-23 | End: 2018-10-24

## 2018-10-23 RX ORDER — NALOXONE HYDROCHLORIDE 4 MG/.1ML
0.1 SPRAY NASAL
Qty: 0 | Refills: 0 | Status: DISCONTINUED | OUTPATIENT
Start: 2018-10-23 | End: 2018-10-26

## 2018-10-23 RX ORDER — SODIUM CHLORIDE 9 MG/ML
300 INJECTION, SOLUTION INTRAVENOUS ONCE
Qty: 0 | Refills: 0 | Status: DISCONTINUED | OUTPATIENT
Start: 2018-10-23 | End: 2018-10-23

## 2018-10-23 RX ORDER — DOCUSATE SODIUM 100 MG
100 CAPSULE ORAL
Qty: 0 | Refills: 0 | Status: DISCONTINUED | OUTPATIENT
Start: 2018-10-23 | End: 2018-10-26

## 2018-10-23 RX ORDER — BUTORPHANOL TARTRATE 2 MG/ML
2 INJECTION, SOLUTION INTRAMUSCULAR; INTRAVENOUS ONCE
Qty: 0 | Refills: 0 | Status: DISCONTINUED | OUTPATIENT
Start: 2018-10-23 | End: 2018-10-26

## 2018-10-23 RX ORDER — GENTAMICIN SULFATE 40 MG/ML
80 VIAL (ML) INJECTION EVERY 8 HOURS
Qty: 0 | Refills: 0 | Status: COMPLETED | OUTPATIENT
Start: 2018-10-23 | End: 2018-10-24

## 2018-10-23 RX ORDER — CITRIC ACID/SODIUM CITRATE 300-500 MG
15 SOLUTION, ORAL ORAL EVERY 4 HOURS
Qty: 0 | Refills: 0 | Status: DISCONTINUED | OUTPATIENT
Start: 2018-10-23 | End: 2018-10-23

## 2018-10-23 RX ORDER — SIMETHICONE 80 MG/1
80 TABLET, CHEWABLE ORAL EVERY 4 HOURS
Qty: 0 | Refills: 0 | Status: DISCONTINUED | OUTPATIENT
Start: 2018-10-23 | End: 2018-10-26

## 2018-10-23 RX ORDER — OXYCODONE AND ACETAMINOPHEN 5; 325 MG/1; MG/1
2 TABLET ORAL EVERY 6 HOURS
Qty: 0 | Refills: 0 | Status: DISCONTINUED | OUTPATIENT
Start: 2018-10-23 | End: 2018-10-26

## 2018-10-23 RX ORDER — OXYTOCIN 10 UNIT/ML
333.33 VIAL (ML) INJECTION
Qty: 20 | Refills: 0 | Status: DISCONTINUED | OUTPATIENT
Start: 2018-10-23 | End: 2018-10-23

## 2018-10-23 RX ORDER — LANOLIN
1 OINTMENT (GRAM) TOPICAL
Qty: 0 | Refills: 0 | Status: DISCONTINUED | OUTPATIENT
Start: 2018-10-23 | End: 2018-10-26

## 2018-10-23 RX ORDER — ONDANSETRON 8 MG/1
4 TABLET, FILM COATED ORAL EVERY 6 HOURS
Qty: 0 | Refills: 0 | Status: DISCONTINUED | OUTPATIENT
Start: 2018-10-23 | End: 2018-10-26

## 2018-10-23 RX ORDER — GENTAMICIN SULFATE 40 MG/ML
80 VIAL (ML) INJECTION ONCE
Qty: 0 | Refills: 0 | Status: COMPLETED | OUTPATIENT
Start: 2018-10-23 | End: 2018-10-23

## 2018-10-23 RX ORDER — ENOXAPARIN SODIUM 100 MG/ML
40 INJECTION SUBCUTANEOUS EVERY 24 HOURS
Qty: 0 | Refills: 0 | Status: DISCONTINUED | OUTPATIENT
Start: 2018-10-23 | End: 2018-10-26

## 2018-10-23 RX ORDER — MORPHINE SULFATE 50 MG/1
3 CAPSULE, EXTENDED RELEASE ORAL ONCE
Qty: 0 | Refills: 0 | Status: DISCONTINUED | OUTPATIENT
Start: 2018-10-23 | End: 2018-10-26

## 2018-10-23 RX ADMIN — ENOXAPARIN SODIUM 40 MILLIGRAM(S): 100 INJECTION SUBCUTANEOUS at 22:45

## 2018-10-23 RX ADMIN — Medication 100 MILLIGRAM(S): at 18:11

## 2018-10-23 RX ADMIN — Medication 100 MILLIGRAM(S): at 08:07

## 2018-10-23 RX ADMIN — Medication 30 MILLIGRAM(S): at 11:45

## 2018-10-23 RX ADMIN — Medication 100 MILLIGRAM(S): at 07:44

## 2018-10-23 NOTE — OB PROVIDER H&P - HISTORY OF PRESENT ILLNESS
33y.o.  @ 40wks by LMP presents for primary  for breech presentation. Pt denies ctx, ROM, VB And states good FM.

## 2018-10-23 NOTE — OB PROVIDER H&P - NSHPPHYSICALEXAM_GEN_ALL_CORE
HR: 101 (10-23-18 @ 06:58) (101 - 101)  BP: 134/85 (10-23-18 @ 06:58) (134/85 - 134/85)    Abd: gravid, soft  VE deferred  Ctx: none noted  FH: 130 moderate variability, +accels, Cat I

## 2018-10-23 NOTE — PACU DISCHARGE NOTE - COMMENTS
No anesthesia complications  Awake and alert  Airway:Patent  Pain:0/10  BP:123/59  HR:85  RR:16  Temp:97.4  SpO2:98%  PONV:None
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
## 2018-10-23 NOTE — PRE-ANESTHESIA EVALUATION ADULT - NSANTHOSAYNRD_GEN_A_CORE
No. JACKY screening performed.  STOP BANG Legend: 0-2 = LOW Risk; 3-4 = INTERMEDIATE Risk; 5-8 = HIGH Risk

## 2018-10-23 NOTE — OB PROVIDER H&P - NS_OBGYNHISTORY_OBGYN_ALL_OB_FT
OBHx: Ectopic tx w/ MTX 2011  GYNHx: +HPV w/ nl PAP- s/p colpo, subsequent PAP WNL, Pt has +HSV 2 serum but has never had outbreak

## 2018-10-23 NOTE — BRIEF OPERATIVE NOTE - OPERATION/FINDINGS
live female infant weighing 4090g (9lb) with APGARs 9/9 delivered in incomplete breech presentation. Normal appearing uterus, B/L tubes/ovaries.

## 2018-10-23 NOTE — PROGRESS NOTE ADULT - ASSESSMENT
A/P: S/P primary C/S for breech presentation, POD0, recovering well   - UO adequate, d/c thomas as per duramorph at 0058 if remains adequate  - advance diet as tolerated to clears   - pain mgmt prn   - simethicone/colace  - encourage incentive spirometer use   - monitor vitals/bleeding   - dressing change in PM   - routine postoperative care A/P: S/P primary C/S for breech presentation, POD0, recovering well   - UO adequate, d/c thomas as per duramorph at 0058 if remains adequate  - advance diet as tolerated to clears   - pain mgmt prn   - simethicone/colace  - encourage incentive spirometer use   - monitor vitals/bleeding   - dressing change in PM   - f/u AM CBC   - routine postoperative care

## 2018-10-24 LAB
BASOPHILS # BLD AUTO: 0.05 K/UL — SIGNIFICANT CHANGE UP (ref 0–0.2)
BASOPHILS NFR BLD AUTO: 0.4 % — SIGNIFICANT CHANGE UP (ref 0–1)
EOSINOPHIL # BLD AUTO: 0.08 K/UL — SIGNIFICANT CHANGE UP (ref 0–0.7)
EOSINOPHIL NFR BLD AUTO: 0.7 % — SIGNIFICANT CHANGE UP (ref 0–8)
HCT VFR BLD CALC: 27.5 % — LOW (ref 37–47)
HGB BLD-MCNC: 9.4 G/DL — LOW (ref 12–16)
IMM GRANULOCYTES NFR BLD AUTO: 0.8 % — HIGH (ref 0.1–0.3)
LYMPHOCYTES # BLD AUTO: 1.96 K/UL — SIGNIFICANT CHANGE UP (ref 1.2–3.4)
LYMPHOCYTES # BLD AUTO: 16.9 % — LOW (ref 20.5–51.1)
MCHC RBC-ENTMCNC: 31.5 PG — HIGH (ref 27–31)
MCHC RBC-ENTMCNC: 34.2 G/DL — SIGNIFICANT CHANGE UP (ref 32–37)
MCV RBC AUTO: 92.3 FL — SIGNIFICANT CHANGE UP (ref 81–99)
MONOCYTES # BLD AUTO: 0.66 K/UL — HIGH (ref 0.1–0.6)
MONOCYTES NFR BLD AUTO: 5.7 % — SIGNIFICANT CHANGE UP (ref 1.7–9.3)
NEUTROPHILS # BLD AUTO: 8.77 K/UL — HIGH (ref 1.4–6.5)
NEUTROPHILS NFR BLD AUTO: 75.5 % — HIGH (ref 42.2–75.2)
NRBC # BLD: 0 /100 WBCS — SIGNIFICANT CHANGE UP (ref 0–0)
PLATELET # BLD AUTO: 156 K/UL — SIGNIFICANT CHANGE UP (ref 130–400)
RBC # BLD: 2.98 M/UL — LOW (ref 4.2–5.4)
RBC # FLD: 14 % — SIGNIFICANT CHANGE UP (ref 11.5–14.5)
WBC # BLD: 11.61 K/UL — HIGH (ref 4.8–10.8)
WBC # FLD AUTO: 11.61 K/UL — HIGH (ref 4.8–10.8)

## 2018-10-24 RX ADMIN — Medication 600 MILLIGRAM(S): at 11:07

## 2018-10-24 RX ADMIN — SIMETHICONE 80 MILLIGRAM(S): 80 TABLET, CHEWABLE ORAL at 11:07

## 2018-10-24 RX ADMIN — Medication 100 MILLIGRAM(S): at 02:11

## 2018-10-24 RX ADMIN — Medication 100 MILLIGRAM(S): at 06:39

## 2018-10-24 RX ADMIN — SIMETHICONE 80 MILLIGRAM(S): 80 TABLET, CHEWABLE ORAL at 21:46

## 2018-10-24 RX ADMIN — OXYCODONE AND ACETAMINOPHEN 1 TABLET(S): 5; 325 TABLET ORAL at 21:47

## 2018-10-24 RX ADMIN — Medication 100 MILLIGRAM(S): at 03:40

## 2018-10-24 RX ADMIN — Medication 325 MILLIGRAM(S): at 10:49

## 2018-10-24 RX ADMIN — ENOXAPARIN SODIUM 40 MILLIGRAM(S): 100 INJECTION SUBCUTANEOUS at 21:46

## 2018-10-24 RX ADMIN — Medication 1 TABLET(S): at 10:49

## 2018-10-24 RX ADMIN — SIMETHICONE 80 MILLIGRAM(S): 80 TABLET, CHEWABLE ORAL at 06:39

## 2018-10-24 RX ADMIN — Medication 600 MILLIGRAM(S): at 17:09

## 2018-10-24 RX ADMIN — Medication 100 MILLIGRAM(S): at 17:10

## 2018-10-24 RX ADMIN — Medication 600 MILLIGRAM(S): at 05:06
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
## 2018-10-24 NOTE — PROGRESS NOTE ADULT - ASSESSMENT
A/P: S/P primary C/S for breech presentation, POD1, recovering well   - clear liquids, advance as tolerated   - pain mgmt prn   - simethicone/colace  - encourage incentive spirometer use  - encourage ambulation, PO hydration  - monitor vitals, bleeding   - routine postoperative care

## 2018-10-25 RX ORDER — TETANUS TOXOID, REDUCED DIPHTHERIA TOXOID AND ACELLULAR PERTUSSIS VACCINE, ADSORBED 5; 2.5; 8; 8; 2.5 [IU]/.5ML; [IU]/.5ML; UG/.5ML; UG/.5ML; UG/.5ML
0.5 SUSPENSION INTRAMUSCULAR ONCE
Qty: 0 | Refills: 0 | Status: COMPLETED | OUTPATIENT
Start: 2018-10-25 | End: 2018-10-25

## 2018-10-25 RX ORDER — INFLUENZA VIRUS VACCINE 15; 15; 15; 15 UG/.5ML; UG/.5ML; UG/.5ML; UG/.5ML
0.5 SUSPENSION INTRAMUSCULAR ONCE
Qty: 0 | Refills: 0 | Status: COMPLETED | OUTPATIENT
Start: 2018-10-25 | End: 2018-10-26

## 2018-10-25 RX ADMIN — Medication 100 MILLIGRAM(S): at 06:42

## 2018-10-25 RX ADMIN — Medication 600 MILLIGRAM(S): at 11:56

## 2018-10-25 RX ADMIN — Medication 325 MILLIGRAM(S): at 11:55

## 2018-10-25 RX ADMIN — ENOXAPARIN SODIUM 40 MILLIGRAM(S): 100 INJECTION SUBCUTANEOUS at 22:28

## 2018-10-25 RX ADMIN — Medication 100 MILLIGRAM(S): at 17:15

## 2018-10-25 RX ADMIN — TETANUS TOXOID, REDUCED DIPHTHERIA TOXOID AND ACELLULAR PERTUSSIS VACCINE, ADSORBED 0.5 MILLILITER(S): 5; 2.5; 8; 8; 2.5 SUSPENSION INTRAMUSCULAR at 16:53

## 2018-10-25 RX ADMIN — OXYCODONE AND ACETAMINOPHEN 2 TABLET(S): 5; 325 TABLET ORAL at 22:29

## 2018-10-25 RX ADMIN — Medication 600 MILLIGRAM(S): at 04:26

## 2018-10-25 RX ADMIN — Medication 1 TABLET(S): at 11:55

## 2018-10-25 RX ADMIN — Medication 600 MILLIGRAM(S): at 17:15

## 2018-10-25 NOTE — PROGRESS NOTE ADULT - ASSESSMENT
33 year old female status post primary  section for breech, now post op day # 2  Afebrile and vs stable.  Ambulatory. Voiding without complaints.  tolerating regular diet.  Passing rectal flatus/no bowel movement yet. not uncomfortable.  Nursing without complaints.    pe; abdomen: soft, minimal tympany, incision healing well        lochia light.  no calf tenderness.    imp: p/op primary  section day # 2 doing well    plan; d/c iv lock;  order tdap for patient as per request          probable discharge tomorrow.

## 2018-10-25 NOTE — PROGRESS NOTE ADULT - ASSESSMENT
A/P: S/P primary C/S for breech presentation, POD2, recovering well   - encourage ambulation, PO hydration  - continue with regular diet   - pain mgmt prn   - monitor vital signs, bleeding   - routine postoperative care   - anticipate d/c home tomorrow

## 2018-10-26 ENCOUNTER — TRANSCRIPTION ENCOUNTER (OUTPATIENT)
Age: 33
End: 2018-10-26

## 2018-10-26 VITALS
SYSTOLIC BLOOD PRESSURE: 128 MMHG | HEART RATE: 89 BPM | RESPIRATION RATE: 19 BRPM | DIASTOLIC BLOOD PRESSURE: 72 MMHG | TEMPERATURE: 97 F

## 2018-10-26 RX ORDER — VALACYCLOVIR 500 MG/1
500 TABLET, FILM COATED ORAL
Qty: 0 | Refills: 0 | COMMUNITY

## 2018-10-26 RX ORDER — IBUPROFEN 200 MG
1 TABLET ORAL
Qty: 0 | Refills: 0 | DISCHARGE
Start: 2018-10-26

## 2018-10-26 RX ORDER — DOCUSATE SODIUM 100 MG
1 CAPSULE ORAL
Qty: 60 | Refills: 0
Start: 2018-10-26 | End: 2018-11-24

## 2018-10-26 RX ORDER — FERROUS SULFATE 325(65) MG
0 TABLET ORAL
Qty: 0 | Refills: 0 | COMMUNITY

## 2018-10-26 RX ADMIN — Medication 600 MILLIGRAM(S): at 10:51

## 2018-10-26 RX ADMIN — Medication 100 MILLIGRAM(S): at 05:49

## 2018-10-26 RX ADMIN — Medication 600 MILLIGRAM(S): at 04:56

## 2018-10-26 RX ADMIN — INFLUENZA VIRUS VACCINE 0.5 MILLILITER(S): 15; 15; 15; 15 SUSPENSION INTRAMUSCULAR at 10:48

## 2018-10-26 NOTE — DISCHARGE NOTE OB - PATIENT PORTAL LINK FT
You can access the [facility] Patient Portal, offered by [facility], by registering with the following website: [email or web address]
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
## 2018-10-26 NOTE — DISCHARGE NOTE OB - CARE PROVIDER_API CALL
Hiro Hernandez), Obstetrics and Gynecology  [address] [place] [address]  Phone: [phone]  Fax: [phone]

## 2018-10-26 NOTE — DISCHARGE NOTE OB - MEDICATION SUMMARY - MEDICATIONS TO TAKE
I will START or STAY ON the medications listed below when I get home from the hospital:    ibuprofen 600 mg oral tablet  -- 1 tab(s) by mouth every 6 hours, As needed, Mild Pain (1 - 3)  -- Indication: For pain    Percocet 5/325 oral tablet  -- 1 tab(s) by mouth every 6 hours MDD:4 as needed  -- Caution federal law prohibits the transfer of this drug to any person other  than the person for whom it was prescribed.  May cause drowsiness.  Alcohol may intensify this effect.  Use care when operating dangerous machinery.  This prescription cannot be refilled.  This product contains acetaminophen.  Do not use  with any other product containing acetaminophen to prevent possible liver damage.  Using more of this medication than prescribed may cause serious breathing problems.    -- Indication: For Severe pain    Colace 100 mg oral capsule  -- 1 cap(s) by mouth 1 to 2 times a day   -- Medication should be taken with plenty of water.    -- Indication: For Constipation

## 2018-10-26 NOTE — DISCHARGE NOTE OB - CARE PLAN
Principal Discharge DX:	Delivery by  section for breech presentation  Goal:	healthy mother and baby  Assessment and plan of treatment:	Nothing in the vagina for 6 weeks (no sex, no tampons, no douching, no swimming pools). Avoid tub baths, you may shower.    If you have a fever of 100.4F or greater, severe vaginal bleeding, or severe abdominal pain, call your Ob/Gyn or come to the emergency department immediately. Principal Discharge DX:	Delivery by  section for breech presentation  Goal:	healthy mother and baby  Assessment and plan of treatment:	Nothing in the vagina for 6 weeks (no sex, no tampons, no douching, no swimming pools). Avoid tub baths, you may shower.  No heavy lifting, incision care.    If you have a fever of 100.4F or greater, severe vaginal bleeding, or severe abdominal pain, call your Ob/Gyn or come to the emergency department immediately.

## 2018-10-26 NOTE — PROGRESS NOTE ADULT - ASSESSMENT
34yo s/p pLTCS for breech presentation, POD #3, Doing well     Plan:  - Routine postop care  - Advance to regular diet  - Encourage ambulation, IS use, PO hydration and breastfeeding  - Discharge home today, instructions discussed

## 2018-10-26 NOTE — PROGRESS NOTE ADULT - ASSESSMENT
A/P: S/P primary C/S for breech presentation, POD3, recovering well  - 1 isolated elevated BP yesterday, no criteria met for gHTN   - monitor vital signs, bleeding  - encourage ambulation, PO hydration  - anticipate d/c home today  - f/u in 1 week for incision check, 6 weeks for PP visit. A/P: S/P primary C/S for breech presentation, POD3, recovering well  - 1 isolated elevated BP yesterday, no criteria met for gHTN   - monitor vital signs, bleeding  - encourage ambulation, PO hydration  - anticipate d/c home today  - f/u in 1 week for incision check, 6 weeks for PP visit.   - scripts sent to pharmacy

## 2018-10-26 NOTE — DISCHARGE NOTE OB - PLAN OF CARE
healthy mother and baby Nothing in the vagina for 6 weeks (no sex, no tampons, no douching, no swimming pools). Avoid tub baths, you may shower.    If you have a fever of 100.4F or greater, severe vaginal bleeding, or severe abdominal pain, call your Ob/Gyn or come to the emergency department immediately. Nothing in the vagina for 6 weeks (no sex, no tampons, no douching, no swimming pools). Avoid tub baths, you may shower.  No heavy lifting, incision care.    If you have a fever of 100.4F or greater, severe vaginal bleeding, or severe abdominal pain, call your Ob/Gyn or come to the emergency department immediately.

## 2018-11-01 DIAGNOSIS — R03.0 ELEVATED BLOOD-PRESSURE READING, WITHOUT DIAGNOSIS OF HYPERTENSION: ICD-10-CM

## 2018-11-01 DIAGNOSIS — Z3A.40 40 WEEKS GESTATION OF PREGNANCY: ICD-10-CM

## 2018-11-01 DIAGNOSIS — Z87.891 PERSONAL HISTORY OF NICOTINE DEPENDENCE: ICD-10-CM

## 2018-11-01 DIAGNOSIS — D64.9 ANEMIA, UNSPECIFIED: ICD-10-CM

## 2018-11-01 DIAGNOSIS — Z33.1 PREGNANT STATE, INCIDENTAL: ICD-10-CM

## 2018-11-01 DIAGNOSIS — Z88.0 ALLERGY STATUS TO PENICILLIN: ICD-10-CM

## 2018-11-01 DIAGNOSIS — Z88.2 ALLERGY STATUS TO SULFONAMIDES: ICD-10-CM

## 2018-11-01 DIAGNOSIS — B00.9 HERPESVIRAL INFECTION, UNSPECIFIED: ICD-10-CM

## 2018-11-01 DIAGNOSIS — O32.8XX0 MATERNAL CARE FOR OTHER MALPRESENTATION OF FETUS, NOT APPLICABLE OR UNSPECIFIED: ICD-10-CM

## 2018-11-01 DIAGNOSIS — O98.512 OTHER VIRAL DISEASES COMPLICATING PREGNANCY, SECOND TRIMESTER: ICD-10-CM

## 2018-11-13 ENCOUNTER — APPOINTMENT (OUTPATIENT)
Dept: OBGYN | Facility: CLINIC | Age: 33
End: 2018-11-13
Payer: COMMERCIAL

## 2018-11-13 VITALS — BODY MASS INDEX: 33.17 KG/M2 | WEIGHT: 158 LBS | HEIGHT: 58 IN

## 2018-11-13 PROCEDURE — 0503F POSTPARTUM CARE VISIT: CPT

## 2018-11-29 NOTE — PROGRESS NOTE ADULT - SUBJECTIVE AND OBJECTIVE BOX
Called patient to talk to her about her Watchman referral from Dr Wallace Duke. I informed her that Dr Brenda Malik would like her to have a CTA. Orders placed into Epic.   Mailed her an informational folder with a map to the hospital.  After she has her CTA, we will
JATINDER ACKERMANRENA  33y  Female    PGY2 Note:    Pt recovering well, no acute complaints.     Ambulating: No  Voiding: Ruiz in place: UO: 475cc/4h--> 118.75cc/hr (min 42cc/hr)  Flatus: No  Bowel movements: No   Breast or bottle feeding: Breast feeding  Diet: NPO     MEDICATIONS  (STANDING):  clindamycin IVPB 900 milliGRAM(s) IV Intermittent every 8 hours  docusate sodium 100 milliGRAM(s) Oral two times a day  enoxaparin Injectable 40 milliGRAM(s) SubCutaneous every 24 hours  ferrous    sulfate 325 milliGRAM(s) Oral daily  gentamicin   IVPB 80 milliGRAM(s) IV Intermittent every 8 hours  lactated ringers. 1000 milliLiter(s) (125 mL/Hr) IV Continuous <Continuous>  lactated ringers. 1000 milliLiter(s) (125 mL/Hr) IV Continuous <Continuous>  morphine PF Epidural 3 milliGRAM(s) Epidural once  oxytocin Infusion 41.667 milliUNIT(s)/Min (125 mL/Hr) IV Continuous <Continuous>  prenatal multivitamin 1 Tablet(s) Oral daily    MEDICATIONS  (PRN):  butorphanol Injectable 2 milliGRAM(s) IV Push once PRN Severe Pain (7 - 10)  diphenhydrAMINE 25 milliGRAM(s) Oral every 6 hours PRN Itching  ibuprofen  Tablet. 600 milliGRAM(s) Oral every 6 hours PRN Mild Pain (1 - 3)  lanolin Ointment 1 Application(s) Topical every 3 hours PRN Sore Nipples  naloxone Injectable 0.1 milliGRAM(s) IV Push every 3 minutes PRN For ANY of the following changes in patient status:  A. RR LESS THAN 10 breaths per minute, B. Oxygen saturation LESS THAN 90%, C. Sedation score of 6  ondansetron Injectable 4 milliGRAM(s) IV Push every 6 hours PRN Nausea  oxyCODONE    5 mG/acetaminophen 325 mG 1 Tablet(s) Oral every 3 hours PRN Moderate Pain (4 - 6)  oxyCODONE    5 mG/acetaminophen 325 mG 2 Tablet(s) Oral every 6 hours PRN Severe Pain (7 - 10)  simethicone 80 milliGRAM(s) Chew every 4 hours PRN Gas      PAST MEDICAL & SURGICAL HISTORY:  Ectopic pregnancy, unspecified location, unspecified whether intrauterine pregnancy present: tx with methotrexate  Anemia  S/P tonsillectomy  History of breast augmentation      Physical Exam  Vital Signs Last 24 Hrs  T(C): 35.7 (23 Oct 2018 15:18), Max: 36.7 (23 Oct 2018 07:25)  T(F): 96.2 (23 Oct 2018 15:18), Max: 98 (23 Oct 2018 07:25)  HR: 98 (23 Oct 2018 15:18) (83 - 101)  BP: 110/58 (23 Oct 2018 15:18) (110/58 - 137/76)  RR: 18 (23 Oct 2018 15:18) (18 - 18)  SpO2: 99% (23 Oct 2018 11:29) (92% - 99%)  Gen: AAOx3, NAD  Heart: RRR, S1S2+  Lungs: CTA B/L, no r/r/w   Fundus: firm, below umbilicus   Wound: dressing in place c/d/i   Abd: Soft, appropriately tender, nondistended, BS+  Lochia: minimal   Ext: No calf tenderness, no swelling; SCDs in place    Labs:                        11.3   8.25  )-----------( 200      ( 22 Oct 2018 11:15 )             32.8
JATINDER TRISHACAMRONRIRENA  33y  Female    PGY2 Note:    Pt recovering well, no acute complaints.     Ambulating: Yes  Voiding: Yes (Voided X1- 600cc)   Flatus: No  Bowel movements: No   Breast or bottle feeding: Breast feeding  Diet: Clear liquids     MEDICATIONS  (STANDING):  docusate sodium 100 milliGRAM(s) Oral two times a day  enoxaparin Injectable 40 milliGRAM(s) SubCutaneous every 24 hours  ferrous    sulfate 325 milliGRAM(s) Oral daily  lactated ringers. 1000 milliLiter(s) (125 mL/Hr) IV Continuous <Continuous>  lactated ringers. 1000 milliLiter(s) (125 mL/Hr) IV Continuous <Continuous>  morphine PF Epidural 3 milliGRAM(s) Epidural once  oxytocin Infusion 41.667 milliUNIT(s)/Min (125 mL/Hr) IV Continuous <Continuous>  prenatal multivitamin 1 Tablet(s) Oral daily    MEDICATIONS  (PRN):  butorphanol Injectable 2 milliGRAM(s) IV Push once PRN Severe Pain (7 - 10)  diphenhydrAMINE 25 milliGRAM(s) Oral every 6 hours PRN Itching  ibuprofen  Tablet. 600 milliGRAM(s) Oral every 6 hours PRN Mild Pain (1 - 3)  lanolin Ointment 1 Application(s) Topical every 3 hours PRN Sore Nipples  naloxone Injectable 0.1 milliGRAM(s) IV Push every 3 minutes PRN For ANY of the following changes in patient status:  A. RR LESS THAN 10 breaths per minute, B. Oxygen saturation LESS THAN 90%, C. Sedation score of 6  ondansetron Injectable 4 milliGRAM(s) IV Push every 6 hours PRN Nausea  oxyCODONE    5 mG/acetaminophen 325 mG 1 Tablet(s) Oral every 3 hours PRN Moderate Pain (4 - 6)  oxyCODONE    5 mG/acetaminophen 325 mG 2 Tablet(s) Oral every 6 hours PRN Severe Pain (7 - 10)  simethicone 80 milliGRAM(s) Chew every 4 hours PRN Gas      PAST MEDICAL & SURGICAL HISTORY:  Ectopic pregnancy, unspecified location, unspecified whether intrauterine pregnancy present: tx with methotrexate  Anemia  S/P tonsillectomy  History of breast augmentation      Physical Exam  Vital Signs Last 24 Hrs  T(C): 36.2 (23 Oct 2018 23:39), Max: 36.7 (23 Oct 2018 07:25)  T(F): 97.2 (23 Oct 2018 23:39), Max: 98 (23 Oct 2018 07:25)  HR: 92 (23 Oct 2018 23:39) (83 - 103)  BP: 123/68 (23 Oct 2018 23:39) (110/58 - 137/76)  RR: 18 (23 Oct 2018 23:39) (18 - 20)  SpO2: 99% (23 Oct 2018 11:29) (92% - 99%)  Gen: AAOx3, NAD  Heart: RRR, S1S2+  Lungs: CTA B/L, no r/r/w   Fundus: firm, below umbilicus   Wound: dressing changed in PM, steris in place c/d/i   Abd: Soft, appropriately tender, mildly distended, BS+  Lochia: minimal   Ext: No calf tenderness, no swelling    Labs: B pos, rubella immune, RPR- NR                         9.4    11.61 )-----------( 156      ( 24 Oct 2018 05:22 )             27.5                         11.3   8.25  )-----------( 200      ( 22 Oct 2018 11:15 )             32.8
JATINDER TRISHAPASHA  33y  Female    PGY2 Note:    Pt recovering well, no acute complaints.     Ambulating: Yes  Voiding: Yes  Flatus: Yes  Bowel movements: No   Breast or bottle feeding: Both  Diet: Regular    MEDICATIONS  (STANDING):  docusate sodium 100 milliGRAM(s) Oral two times a day  enoxaparin Injectable 40 milliGRAM(s) SubCutaneous every 24 hours  ferrous    sulfate 325 milliGRAM(s) Oral daily  influenza   Vaccine 0.5 milliLiter(s) IntraMuscular once  morphine PF Epidural 3 milliGRAM(s) Epidural once  oxytocin Infusion 41.667 milliUNIT(s)/Min (125 mL/Hr) IV Continuous <Continuous>  prenatal multivitamin 1 Tablet(s) Oral daily    MEDICATIONS  (PRN):  butorphanol Injectable 2 milliGRAM(s) IV Push once PRN Severe Pain (7 - 10)  diphenhydrAMINE 25 milliGRAM(s) Oral every 6 hours PRN Itching  ibuprofen  Tablet. 600 milliGRAM(s) Oral every 6 hours PRN Mild Pain (1 - 3)  lanolin Ointment 1 Application(s) Topical every 3 hours PRN Sore Nipples  naloxone Injectable 0.1 milliGRAM(s) IV Push every 3 minutes PRN For ANY of the following changes in patient status:  A. RR LESS THAN 10 breaths per minute, B. Oxygen saturation LESS THAN 90%, C. Sedation score of 6  ondansetron Injectable 4 milliGRAM(s) IV Push every 6 hours PRN Nausea  oxyCODONE    5 mG/acetaminophen 325 mG 1 Tablet(s) Oral every 3 hours PRN Moderate Pain (4 - 6)  oxyCODONE    5 mG/acetaminophen 325 mG 2 Tablet(s) Oral every 6 hours PRN Severe Pain (7 - 10)  simethicone 80 milliGRAM(s) Chew every 4 hours PRN Gas      PAST MEDICAL & SURGICAL HISTORY:  Ectopic pregnancy, unspecified location, unspecified whether intrauterine pregnancy present: tx with methotrexate  Anemia  S/P tonsillectomy  History of breast augmentation      Physical Exam  Vital Signs Last 24 Hrs  T(C): 36.9 (25 Oct 2018 04:12), Max: 36.9 (25 Oct 2018 04:12)  T(F): 98.5 (25 Oct 2018 04:12), Max: 98.5 (25 Oct 2018 04:12)  HR: 80 (25 Oct 2018 04:12) (80 - 107)  BP: 104/59 (25 Oct 2018 04:12) (104/59 - 136/72)  RR: 19 (25 Oct 2018 04:12) (18 - 20)  Gen: AAOx3, NAD  Fundus: firm, below umbilicus   Wound: steris in place c/d/i   Abd: Soft, appropriately tender, nondistended, BS+  Lochia: minimal  Ext: No calf tenderness, no swelling    Labs: B pos, rubella immune, RPR NR                        9.4    11.61 )-----------( 156      ( 24 Oct 2018 05:22 )             27.5                         11.3   8.25  )-----------( 200      ( 22 Oct 2018 11:15 )             32.8
JATINDER TRISHAPASHA  33y  Female    PGY2 Note:    Pt recovering well, no acute complaints.     Ambulating: Yes  Voiding: Yes  Flatus: Yes  Bowel movements: No  Breast or bottle feeding: Both  Diet: Regular    MEDICATIONS  (STANDING):  docusate sodium 100 milliGRAM(s) Oral two times a day  enoxaparin Injectable 40 milliGRAM(s) SubCutaneous every 24 hours  ferrous    sulfate 325 milliGRAM(s) Oral daily  influenza   Vaccine 0.5 milliLiter(s) IntraMuscular once  morphine PF Epidural 3 milliGRAM(s) Epidural once  oxytocin Infusion 41.667 milliUNIT(s)/Min (125 mL/Hr) IV Continuous <Continuous>  prenatal multivitamin 1 Tablet(s) Oral daily    MEDICATIONS  (PRN):  butorphanol Injectable 2 milliGRAM(s) IV Push once PRN Severe Pain (7 - 10)  diphenhydrAMINE 25 milliGRAM(s) Oral every 6 hours PRN Itching  ibuprofen  Tablet. 600 milliGRAM(s) Oral every 6 hours PRN Mild Pain (1 - 3)  lanolin Ointment 1 Application(s) Topical every 3 hours PRN Sore Nipples  naloxone Injectable 0.1 milliGRAM(s) IV Push every 3 minutes PRN For ANY of the following changes in patient status:  A. RR LESS THAN 10 breaths per minute, B. Oxygen saturation LESS THAN 90%, C. Sedation score of 6  ondansetron Injectable 4 milliGRAM(s) IV Push every 6 hours PRN Nausea  oxyCODONE    5 mG/acetaminophen 325 mG 1 Tablet(s) Oral every 3 hours PRN Moderate Pain (4 - 6)  oxyCODONE    5 mG/acetaminophen 325 mG 2 Tablet(s) Oral every 6 hours PRN Severe Pain (7 - 10)  simethicone 80 milliGRAM(s) Chew every 4 hours PRN Gas      PAST MEDICAL & SURGICAL HISTORY:  Ectopic pregnancy, unspecified location, unspecified whether intrauterine pregnancy present: tx with methotrexate  Anemia  S/P tonsillectomy  History of breast augmentation      Physical Exam  Vital Signs Last 24 Hrs  T(C): 35.8 (26 Oct 2018 01:34), Max: 36.4 (25 Oct 2018 16:00)  T(F): 96.4 (26 Oct 2018 01:34), Max: 97.6 (25 Oct 2018 20:49)  HR: 84 (26 Oct 2018 01:34) (84 - 110)  BP: 118/66 (26 Oct 2018 01:34) (118/58 - 143/80)  RR: 18 (26 Oct 2018 01:34) (18 - 20)  Gen: AAOx3, NAD  Fundus: firm, below umbilicus   Wound: steris in place c/d/i   Abd: Soft, nontender, nondistended, BS+  Lochia: minimal  Ext: No calf tenderness, no swelling    Labs:                        9.4    11.61 )-----------( 156      ( 24 Oct 2018 05:22 )             27.5                         11.3   8.25  )-----------( 200      ( 22 Oct 2018 11:15 )             32.8
Patient doing well. Pain well controlled with Motrin and occasional Percocet. Moderate lochia. Voiding without difficulty. Tolerating regular diet with no nausea or vomiting. Passed flatus, no BM. Ambulating around the room. Breast and bottle feeding    Objective:   Vital Signs Last 24 Hrs  T(C): 36.1 (26 Oct 2018 07:30), Max: 36.4 (25 Oct 2018 16:00)  T(F): 96.9 (26 Oct 2018 07:30), Max: 97.6 (25 Oct 2018 20:49)  HR: 89 (26 Oct 2018 07:30) (84 - 110)  BP: 128/72 (26 Oct 2018 07:30) (118/58 - 143/80)  BP(mean): --  RR: 19 (26 Oct 2018 07:30) (18 - 20)  SpO2: --  Gen: NAD  Abd: Soft, Appropriately tender around incision, Nondistended, Fundus firm below the umbilicus. Incision C/D/I  Ext: no tenderness, mild edema    Labs:      Medications:  butorphanol Injectable 2 milliGRAM(s) IV Push once PRN  diphenhydrAMINE 25 milliGRAM(s) Oral every 6 hours PRN  docusate sodium 100 milliGRAM(s) Oral two times a day  enoxaparin Injectable 40 milliGRAM(s) SubCutaneous every 24 hours  ferrous    sulfate 325 milliGRAM(s) Oral daily  ibuprofen  Tablet. 600 milliGRAM(s) Oral every 6 hours PRN  influenza   Vaccine 0.5 milliLiter(s) IntraMuscular once  lanolin Ointment 1 Application(s) Topical every 3 hours PRN  morphine PF Epidural 3 milliGRAM(s) Epidural once  naloxone Injectable 0.1 milliGRAM(s) IV Push every 3 minutes PRN  ondansetron Injectable 4 milliGRAM(s) IV Push every 6 hours PRN  oxyCODONE    5 mG/acetaminophen 325 mG 1 Tablet(s) Oral every 3 hours PRN  oxyCODONE    5 mG/acetaminophen 325 mG 2 Tablet(s) Oral every 6 hours PRN  oxytocin Infusion 41.667 milliUNIT(s)/Min IV Continuous <Continuous>  prenatal multivitamin 1 Tablet(s) Oral daily  simethicone 80 milliGRAM(s) Chew every 4 hours PRN
Patient doing well. Pain well controlled with Motrin. Moderate lochia. Ruiz removed this AM and patient has voided without difficulty. Tolerating clears with no nausea or vomiting. Passed flatus. Ambulating around the room. Breastfeeding.    Objective:   Vital Signs Last 24 Hrs  T(C): 35.6 (24 Oct 2018 07:58), Max: 36.5 (23 Oct 2018 13:30)  T(F): 96 (24 Oct 2018 07:58), Max: 97.7 (23 Oct 2018 13:30)  HR: 107 (24 Oct 2018 07:58) (83 - 107)  BP: 130/67 (24 Oct 2018 07:58) (110/58 - 137/76)  BP(mean): --  RR: 19 (24 Oct 2018 07:58) (18 - 20)  SpO2: 99% (23 Oct 2018 11:29) (92% - 99%)  Gen: NAD  Abd: Soft, appropriately tender around incision, nondistended, fundus firm below the umbilicus, incision C/D/I, no evidence of infection  Ext: no tenderness, mild edema    Labs:                        9.4    11.61 )-----------( 156      ( 24 Oct 2018 05:22 )             27.5       Medications:  butorphanol Injectable 2 milliGRAM(s) IV Push once PRN  diphenhydrAMINE 25 milliGRAM(s) Oral every 6 hours PRN  docusate sodium 100 milliGRAM(s) Oral two times a day  enoxaparin Injectable 40 milliGRAM(s) SubCutaneous every 24 hours  ferrous    sulfate 325 milliGRAM(s) Oral daily  ibuprofen  Tablet. 600 milliGRAM(s) Oral every 6 hours PRN  lanolin Ointment 1 Application(s) Topical every 3 hours PRN  morphine PF Epidural 3 milliGRAM(s) Epidural once  naloxone Injectable 0.1 milliGRAM(s) IV Push every 3 minutes PRN  ondansetron Injectable 4 milliGRAM(s) IV Push every 6 hours PRN  oxyCODONE    5 mG/acetaminophen 325 mG 1 Tablet(s) Oral every 3 hours PRN  oxyCODONE    5 mG/acetaminophen 325 mG 2 Tablet(s) Oral every 6 hours PRN  oxytocin Infusion 41.667 milliUNIT(s)/Min IV Continuous <Continuous>  prenatal multivitamin 1 Tablet(s) Oral daily  simethicone 80 milliGRAM(s) Chew every 4 hours PRN

## 2018-11-30 ENCOUNTER — APPOINTMENT (OUTPATIENT)
Dept: OBGYN | Facility: CLINIC | Age: 33
End: 2018-11-30
Payer: COMMERCIAL

## 2018-11-30 PROCEDURE — 99212 OFFICE O/P EST SF 10 MIN: CPT | Mod: 25

## 2018-11-30 PROCEDURE — 76830 TRANSVAGINAL US NON-OB: CPT

## 2018-11-30 PROCEDURE — 58300 INSERT INTRAUTERINE DEVICE: CPT

## 2018-12-11 ENCOUNTER — APPOINTMENT (OUTPATIENT)
Dept: OBGYN | Facility: CLINIC | Age: 33
End: 2018-12-11
Payer: COMMERCIAL

## 2018-12-11 VITALS — HEIGHT: 58 IN | WEIGHT: 150 LBS | BODY MASS INDEX: 31.49 KG/M2

## 2018-12-11 PROCEDURE — 0503F POSTPARTUM CARE VISIT: CPT

## 2019-01-11 ENCOUNTER — APPOINTMENT (OUTPATIENT)
Dept: OBGYN | Facility: CLINIC | Age: 34
End: 2019-01-11
Payer: COMMERCIAL

## 2019-01-11 PROCEDURE — 76830 TRANSVAGINAL US NON-OB: CPT

## 2019-01-15 ENCOUNTER — RESULT REVIEW (OUTPATIENT)
Age: 34
End: 2019-01-15

## 2019-03-26 ENCOUNTER — APPOINTMENT (OUTPATIENT)
Dept: OBGYN | Facility: CLINIC | Age: 34
End: 2019-03-26

## 2019-04-03 ENCOUNTER — LABORATORY RESULT (OUTPATIENT)
Age: 34
End: 2019-04-03

## 2019-04-03 ENCOUNTER — APPOINTMENT (OUTPATIENT)
Dept: OBGYN | Facility: CLINIC | Age: 34
End: 2019-04-03
Payer: COMMERCIAL

## 2019-04-03 ENCOUNTER — OUTPATIENT (OUTPATIENT)
Dept: OUTPATIENT SERVICES | Facility: HOSPITAL | Age: 34
LOS: 1 days | Discharge: HOME | End: 2019-04-03

## 2019-04-03 VITALS — WEIGHT: 150 LBS | HEIGHT: 58 IN | BODY MASS INDEX: 31.49 KG/M2

## 2019-04-03 DIAGNOSIS — Z90.89 ACQUIRED ABSENCE OF OTHER ORGANS: Chronic | ICD-10-CM

## 2019-04-03 DIAGNOSIS — Z98.82 BREAST IMPLANT STATUS: Chronic | ICD-10-CM

## 2019-04-03 PROCEDURE — 99395 PREV VISIT EST AGE 18-39: CPT

## 2019-04-04 DIAGNOSIS — Z01.419 ENCOUNTER FOR GYNECOLOGICAL EXAMINATION (GENERAL) (ROUTINE) WITHOUT ABNORMAL FINDINGS: ICD-10-CM

## 2019-04-11 DIAGNOSIS — Z01.419 ENCOUNTER FOR GYNECOLOGICAL EXAMINATION (GENERAL) (ROUTINE) WITHOUT ABNORMAL FINDINGS: ICD-10-CM

## 2019-09-25 ENCOUNTER — TRANSCRIPTION ENCOUNTER (OUTPATIENT)
Age: 34
End: 2019-09-25

## 2020-04-07 ENCOUNTER — APPOINTMENT (OUTPATIENT)
Dept: OBGYN | Facility: CLINIC | Age: 35
End: 2020-04-07

## 2020-04-23 ENCOUNTER — TRANSCRIPTION ENCOUNTER (OUTPATIENT)
Age: 35
End: 2020-04-23

## 2020-07-16 ENCOUNTER — APPOINTMENT (OUTPATIENT)
Dept: OBGYN | Facility: CLINIC | Age: 35
End: 2020-07-16
Payer: COMMERCIAL

## 2020-07-16 ENCOUNTER — LABORATORY RESULT (OUTPATIENT)
Age: 35
End: 2020-07-16

## 2020-07-16 VITALS — HEIGHT: 58 IN | TEMPERATURE: 98.1 F | BODY MASS INDEX: 30.23 KG/M2 | WEIGHT: 144 LBS

## 2020-07-16 PROCEDURE — 99395 PREV VISIT EST AGE 18-39: CPT

## 2020-10-08 ENCOUNTER — APPOINTMENT (OUTPATIENT)
Dept: OBGYN | Facility: CLINIC | Age: 35
End: 2020-10-08
Payer: COMMERCIAL

## 2020-10-08 VITALS — BODY MASS INDEX: 30.44 KG/M2 | TEMPERATURE: 97.6 F | WEIGHT: 145 LBS | HEIGHT: 58 IN

## 2020-10-08 PROCEDURE — 99213 OFFICE O/P EST LOW 20 MIN: CPT | Mod: 25

## 2020-10-08 PROCEDURE — 81003 URINALYSIS AUTO W/O SCOPE: CPT | Mod: QW

## 2020-10-08 PROCEDURE — 76830 TRANSVAGINAL US NON-OB: CPT

## 2020-10-16 ENCOUNTER — TRANSCRIPTION ENCOUNTER (OUTPATIENT)
Age: 35
End: 2020-10-16

## 2020-11-02 ENCOUNTER — APPOINTMENT (OUTPATIENT)
Dept: OBGYN | Facility: CLINIC | Age: 35
End: 2020-11-02
Payer: COMMERCIAL

## 2020-11-02 VITALS — HEIGHT: 58 IN | TEMPERATURE: 98.1 F | WEIGHT: 148 LBS | BODY MASS INDEX: 31.07 KG/M2

## 2020-11-02 PROCEDURE — 76830 TRANSVAGINAL US NON-OB: CPT

## 2020-11-02 PROCEDURE — 99072 ADDL SUPL MATRL&STAF TM PHE: CPT

## 2020-11-02 PROCEDURE — 99213 OFFICE O/P EST LOW 20 MIN: CPT | Mod: 25

## 2020-11-02 PROCEDURE — 81003 URINALYSIS AUTO W/O SCOPE: CPT | Mod: QW

## 2020-11-02 RX ORDER — DOXYLAMINE SUCCINATE AND PYRIDOXINE HYDROCHLORIDE 10; 10 MG/1; MG/1
10-10 TABLET, DELAYED RELEASE ORAL
Qty: 60 | Refills: 2 | Status: ACTIVE | COMMUNITY
Start: 2020-11-02 | End: 1900-01-01

## 2020-11-16 ENCOUNTER — LABORATORY RESULT (OUTPATIENT)
Age: 35
End: 2020-11-16

## 2020-11-23 ENCOUNTER — NON-APPOINTMENT (OUTPATIENT)
Age: 35
End: 2020-11-23

## 2020-11-23 ENCOUNTER — APPOINTMENT (OUTPATIENT)
Dept: MATERNAL FETAL MEDICINE | Facility: CLINIC | Age: 35
End: 2020-11-23
Payer: COMMERCIAL

## 2020-11-23 ENCOUNTER — APPOINTMENT (OUTPATIENT)
Dept: OBGYN | Facility: CLINIC | Age: 35
End: 2020-11-23
Payer: COMMERCIAL

## 2020-11-23 VITALS — HEIGHT: 58 IN | WEIGHT: 150 LBS | BODY MASS INDEX: 31.49 KG/M2 | TEMPERATURE: 97.2 F

## 2020-11-23 PROCEDURE — 99243 OFF/OP CNSLTJ NEW/EST LOW 30: CPT

## 2020-11-23 PROCEDURE — 76801 OB US < 14 WKS SINGLE FETUS: CPT

## 2020-11-23 PROCEDURE — 0502F SUBSEQUENT PRENATAL CARE: CPT

## 2020-11-23 PROCEDURE — 76813 OB US NUCHAL MEAS 1 GEST: CPT

## 2020-12-02 ENCOUNTER — NON-APPOINTMENT (OUTPATIENT)
Age: 35
End: 2020-12-02

## 2020-12-21 ENCOUNTER — APPOINTMENT (OUTPATIENT)
Dept: OBGYN | Facility: CLINIC | Age: 35
End: 2020-12-21
Payer: COMMERCIAL

## 2020-12-21 ENCOUNTER — NON-APPOINTMENT (OUTPATIENT)
Age: 35
End: 2020-12-21

## 2020-12-21 VITALS — BODY MASS INDEX: 31.77 KG/M2 | WEIGHT: 152 LBS | TEMPERATURE: 97.6 F

## 2020-12-21 LAB
BILIRUB UR QL STRIP: NORMAL
GLUCOSE UR-MCNC: NORMAL
HCG UR QL: 0.2 EU/DL
HGB UR QL STRIP.AUTO: NORMAL
KETONES UR-MCNC: NORMAL
LEUKOCYTE ESTERASE UR QL STRIP: NORMAL
NITRITE UR QL STRIP: NORMAL
PH UR STRIP: 7
PROT UR STRIP-MCNC: NORMAL
SP GR UR STRIP: 1.01

## 2020-12-21 PROCEDURE — 0502F SUBSEQUENT PRENATAL CARE: CPT

## 2020-12-23 ENCOUNTER — NON-APPOINTMENT (OUTPATIENT)
Age: 35
End: 2020-12-23

## 2021-01-21 ENCOUNTER — NON-APPOINTMENT (OUTPATIENT)
Age: 36
End: 2021-01-21

## 2021-01-21 ENCOUNTER — APPOINTMENT (OUTPATIENT)
Dept: OBGYN | Facility: CLINIC | Age: 36
End: 2021-01-21
Payer: COMMERCIAL

## 2021-01-21 VITALS — BODY MASS INDEX: 33.17 KG/M2 | TEMPERATURE: 97.9 F | HEIGHT: 58 IN | WEIGHT: 158 LBS

## 2021-01-21 LAB
BILIRUB UR QL STRIP: NORMAL
CLARITY UR: CLEAR
GLUCOSE UR-MCNC: NORMAL
HCG UR QL: 0.2 EU/DL
HGB UR QL STRIP.AUTO: NORMAL
KETONES UR-MCNC: NORMAL
LEUKOCYTE ESTERASE UR QL STRIP: NORMAL
NITRITE UR QL STRIP: NORMAL
PH UR STRIP: 6
PH UR STRIP: 7
PROT UR STRIP-MCNC: 0.2
PROT UR STRIP-MCNC: NORMAL
SP GR UR STRIP: 1.01
SP GR UR STRIP: 1.01
SP GR UR STRIP: 1.02
SP GR UR STRIP: 1.03

## 2021-01-21 PROCEDURE — 99072 ADDL SUPL MATRL&STAF TM PHE: CPT

## 2021-01-21 PROCEDURE — 0502F SUBSEQUENT PRENATAL CARE: CPT

## 2021-01-21 PROCEDURE — 76805 OB US >/= 14 WKS SNGL FETUS: CPT

## 2021-01-23 ENCOUNTER — NON-APPOINTMENT (OUTPATIENT)
Age: 36
End: 2021-01-23

## 2021-01-29 ENCOUNTER — NON-APPOINTMENT (OUTPATIENT)
Age: 36
End: 2021-01-29

## 2021-01-30 ENCOUNTER — NON-APPOINTMENT (OUTPATIENT)
Age: 36
End: 2021-01-30

## 2021-02-01 NOTE — OB RN PATIENT PROFILE - NS_PRENATALLABSOURCECYSTICFIBROSIS_OBGYN_ALL_OB
BEHAVIORAL TEAM DISCUSSION    Participants: Lakia Buenrostro CNP; Lulu Hinkle and Renata VIDAL's.  Azeb Benton, RN and Jaden Melchor OT  Progress: pt is stable and will discharge today.  Anticipated length of stay: discharge today  Continued Stay Criteria/Rationale: pt no longer requires acute level of care.    Medical/Physical: Migraine headaches  Constipation  Back pain  Precautions:   Behavioral Orders   Procedures     Code 1 - Restrict to Unit     Routine Programming     As clinically indicated     Single Room     Status 15     Every 15 minutes.     Plan: discharge planned for today to OP LOC  Rationale for change in precautions or plan: pt is stable       hard copy, drawn during this pregnancy

## 2021-02-03 ENCOUNTER — LABORATORY RESULT (OUTPATIENT)
Age: 36
End: 2021-02-03

## 2021-02-06 ENCOUNTER — NON-APPOINTMENT (OUTPATIENT)
Age: 36
End: 2021-02-06

## 2021-02-06 DIAGNOSIS — Z30.432 ENCOUNTER FOR REMOVAL OF INTRAUTERINE CONTRACEPTIVE DEVICE: ICD-10-CM

## 2021-02-06 DIAGNOSIS — Z87.42 PERSONAL HISTORY OF OTHER DISEASES OF THE FEMALE GENITAL TRACT: ICD-10-CM

## 2021-02-08 ENCOUNTER — APPOINTMENT (OUTPATIENT)
Dept: OBGYN | Facility: CLINIC | Age: 36
End: 2021-02-08
Payer: COMMERCIAL

## 2021-02-08 ENCOUNTER — NON-APPOINTMENT (OUTPATIENT)
Age: 36
End: 2021-02-08

## 2021-02-08 VITALS
DIASTOLIC BLOOD PRESSURE: 70 MMHG | WEIGHT: 162 LBS | TEMPERATURE: 97.8 F | BODY MASS INDEX: 33.86 KG/M2 | SYSTOLIC BLOOD PRESSURE: 120 MMHG

## 2021-02-08 PROCEDURE — 0502F SUBSEQUENT PRENATAL CARE: CPT

## 2021-02-22 ENCOUNTER — NON-APPOINTMENT (OUTPATIENT)
Age: 36
End: 2021-02-22

## 2021-02-22 ENCOUNTER — APPOINTMENT (OUTPATIENT)
Dept: OBGYN | Facility: CLINIC | Age: 36
End: 2021-02-22
Payer: COMMERCIAL

## 2021-02-22 VITALS — BODY MASS INDEX: 34 KG/M2 | WEIGHT: 162 LBS | HEIGHT: 58 IN | TEMPERATURE: 97.8 F

## 2021-02-22 LAB
BILIRUB UR QL STRIP: NORMAL
CLARITY UR: CLEAR
GLUCOSE UR-MCNC: NORMAL
HCG UR QL: 0.2 EU/DL
HGB UR QL STRIP.AUTO: NORMAL
KETONES UR-MCNC: NORMAL
LEUKOCYTE ESTERASE UR QL STRIP: NORMAL
NITRITE UR QL STRIP: NORMAL
PH UR STRIP: 7
PROT UR STRIP-MCNC: NORMAL
SP GR UR STRIP: 1.02

## 2021-02-22 PROCEDURE — 0502F SUBSEQUENT PRENATAL CARE: CPT

## 2021-02-24 ENCOUNTER — TRANSCRIPTION ENCOUNTER (OUTPATIENT)
Age: 36
End: 2021-02-24

## 2021-03-08 ENCOUNTER — APPOINTMENT (OUTPATIENT)
Dept: OBGYN | Facility: CLINIC | Age: 36
End: 2021-03-08
Payer: COMMERCIAL

## 2021-03-08 ENCOUNTER — NON-APPOINTMENT (OUTPATIENT)
Age: 36
End: 2021-03-08

## 2021-03-08 VITALS
SYSTOLIC BLOOD PRESSURE: 124 MMHG | BODY MASS INDEX: 35.26 KG/M2 | HEIGHT: 58 IN | WEIGHT: 168 LBS | DIASTOLIC BLOOD PRESSURE: 70 MMHG | TEMPERATURE: 97.6 F

## 2021-03-08 PROCEDURE — 0502F SUBSEQUENT PRENATAL CARE: CPT

## 2021-04-01 ENCOUNTER — APPOINTMENT (OUTPATIENT)
Dept: OBGYN | Facility: CLINIC | Age: 36
End: 2021-04-01

## 2021-04-06 ENCOUNTER — APPOINTMENT (OUTPATIENT)
Dept: OBGYN | Facility: CLINIC | Age: 36
End: 2021-04-06
Payer: COMMERCIAL

## 2021-04-06 ENCOUNTER — NON-APPOINTMENT (OUTPATIENT)
Age: 36
End: 2021-04-06

## 2021-04-06 VITALS — TEMPERATURE: 98 F | BODY MASS INDEX: 35.95 KG/M2 | WEIGHT: 172 LBS

## 2021-04-06 PROCEDURE — 0502F SUBSEQUENT PRENATAL CARE: CPT

## 2021-04-06 PROCEDURE — 76819 FETAL BIOPHYS PROFIL W/O NST: CPT

## 2021-04-06 PROCEDURE — 90471 IMMUNIZATION ADMIN: CPT

## 2021-04-06 PROCEDURE — 90715 TDAP VACCINE 7 YRS/> IM: CPT

## 2021-04-06 PROCEDURE — 76815 OB US LIMITED FETUS(S): CPT

## 2021-04-19 ENCOUNTER — LABORATORY RESULT (OUTPATIENT)
Age: 36
End: 2021-04-19

## 2021-04-19 ENCOUNTER — APPOINTMENT (OUTPATIENT)
Dept: OBGYN | Facility: CLINIC | Age: 36
End: 2021-04-19
Payer: COMMERCIAL

## 2021-04-19 ENCOUNTER — NON-APPOINTMENT (OUTPATIENT)
Age: 36
End: 2021-04-19

## 2021-04-19 VITALS — WEIGHT: 176 LBS | TEMPERATURE: 97.9 F | HEIGHT: 58 IN | BODY MASS INDEX: 36.94 KG/M2

## 2021-04-19 PROCEDURE — 0502F SUBSEQUENT PRENATAL CARE: CPT

## 2021-05-02 ENCOUNTER — LABORATORY RESULT (OUTPATIENT)
Age: 36
End: 2021-05-02

## 2021-05-03 ENCOUNTER — NON-APPOINTMENT (OUTPATIENT)
Age: 36
End: 2021-05-03

## 2021-05-03 ENCOUNTER — APPOINTMENT (OUTPATIENT)
Dept: OBGYN | Facility: CLINIC | Age: 36
End: 2021-05-03
Payer: COMMERCIAL

## 2021-05-03 VITALS — TEMPERATURE: 97.8 F | BODY MASS INDEX: 37.57 KG/M2 | WEIGHT: 179 LBS | HEIGHT: 58 IN

## 2021-05-03 PROCEDURE — 0502F SUBSEQUENT PRENATAL CARE: CPT

## 2021-05-03 RX ORDER — VALACYCLOVIR 500 MG/1
500 TABLET, FILM COATED ORAL TWICE DAILY
Qty: 60 | Refills: 0 | Status: ACTIVE | COMMUNITY
Start: 2021-05-03 | End: 1900-01-01

## 2021-05-12 ENCOUNTER — APPOINTMENT (OUTPATIENT)
Dept: OBGYN | Facility: CLINIC | Age: 36
End: 2021-05-12
Payer: COMMERCIAL

## 2021-05-12 VITALS
WEIGHT: 183 LBS | SYSTOLIC BLOOD PRESSURE: 120 MMHG | DIASTOLIC BLOOD PRESSURE: 80 MMHG | BODY MASS INDEX: 38.25 KG/M2 | TEMPERATURE: 98.2 F

## 2021-05-12 LAB
BILIRUB UR QL STRIP: NORMAL
GLUCOSE UR-MCNC: NORMAL
HCG UR QL: 0.2 EU/DL
HGB UR QL STRIP.AUTO: NORMAL
KETONES UR-MCNC: NORMAL
LEUKOCYTE ESTERASE UR QL STRIP: NORMAL
NITRITE UR QL STRIP: NORMAL
PH UR STRIP: 7
PROT UR STRIP-MCNC: NORMAL
SP GR UR STRIP: 1.02

## 2021-05-12 PROCEDURE — 0502F SUBSEQUENT PRENATAL CARE: CPT

## 2021-05-18 ENCOUNTER — APPOINTMENT (OUTPATIENT)
Dept: OBGYN | Facility: CLINIC | Age: 36
End: 2021-05-18
Payer: COMMERCIAL

## 2021-05-18 ENCOUNTER — NON-APPOINTMENT (OUTPATIENT)
Age: 36
End: 2021-05-18

## 2021-05-18 VITALS — BODY MASS INDEX: 38.41 KG/M2 | WEIGHT: 183 LBS | TEMPERATURE: 98 F | HEIGHT: 58 IN

## 2021-05-18 PROCEDURE — 0503F POSTPARTUM CARE VISIT: CPT

## 2021-05-24 ENCOUNTER — OUTPATIENT (OUTPATIENT)
Dept: OUTPATIENT SERVICES | Facility: HOSPITAL | Age: 36
LOS: 1 days | Discharge: HOME | End: 2021-05-24

## 2021-05-24 ENCOUNTER — LABORATORY RESULT (OUTPATIENT)
Age: 36
End: 2021-05-24

## 2021-05-24 DIAGNOSIS — Z90.89 ACQUIRED ABSENCE OF OTHER ORGANS: Chronic | ICD-10-CM

## 2021-05-24 DIAGNOSIS — Z11.59 ENCOUNTER FOR SCREENING FOR OTHER VIRAL DISEASES: ICD-10-CM

## 2021-05-24 DIAGNOSIS — Z98.82 BREAST IMPLANT STATUS: Chronic | ICD-10-CM

## 2021-05-27 ENCOUNTER — INPATIENT (INPATIENT)
Facility: HOSPITAL | Age: 36
LOS: 1 days | Discharge: HOME | End: 2021-05-29
Attending: OBSTETRICS & GYNECOLOGY | Admitting: OBSTETRICS & GYNECOLOGY
Payer: COMMERCIAL

## 2021-05-27 VITALS
HEIGHT: 58 IN | WEIGHT: 184.09 LBS | SYSTOLIC BLOOD PRESSURE: 119 MMHG | RESPIRATION RATE: 18 BRPM | HEART RATE: 83 BPM | DIASTOLIC BLOOD PRESSURE: 61 MMHG | TEMPERATURE: 98 F

## 2021-05-27 DIAGNOSIS — Z90.89 ACQUIRED ABSENCE OF OTHER ORGANS: Chronic | ICD-10-CM

## 2021-05-27 DIAGNOSIS — Z98.82 BREAST IMPLANT STATUS: Chronic | ICD-10-CM

## 2021-05-27 LAB
AMPHET UR-MCNC: NEGATIVE — SIGNIFICANT CHANGE UP
APPEARANCE UR: CLEAR — SIGNIFICANT CHANGE UP
BARBITURATES UR SCN-MCNC: NEGATIVE — SIGNIFICANT CHANGE UP
BASOPHILS # BLD AUTO: 0.06 K/UL — SIGNIFICANT CHANGE UP (ref 0–0.2)
BASOPHILS NFR BLD AUTO: 0.8 % — SIGNIFICANT CHANGE UP (ref 0–1)
BENZODIAZ UR-MCNC: NEGATIVE — SIGNIFICANT CHANGE UP
BILIRUB UR-MCNC: NEGATIVE — SIGNIFICANT CHANGE UP
BLD GP AB SCN SERPL QL: SIGNIFICANT CHANGE UP
BUPRENORPHINE SCREEN, URINE RESULT: NEGATIVE — SIGNIFICANT CHANGE UP
COCAINE METAB.OTHER UR-MCNC: NEGATIVE — SIGNIFICANT CHANGE UP
COLOR SPEC: COLORLESS — SIGNIFICANT CHANGE UP
DIFF PNL FLD: NEGATIVE — SIGNIFICANT CHANGE UP
EOSINOPHIL # BLD AUTO: 0.1 K/UL — SIGNIFICANT CHANGE UP (ref 0–0.7)
EOSINOPHIL NFR BLD AUTO: 1.3 % — SIGNIFICANT CHANGE UP (ref 0–8)
FENTANYL UR QL: NEGATIVE — SIGNIFICANT CHANGE UP
GLUCOSE UR QL: NEGATIVE — SIGNIFICANT CHANGE UP
HCT VFR BLD CALC: 33.3 % — LOW (ref 37–47)
HGB BLD-MCNC: 11.4 G/DL — LOW (ref 12–16)
IMM GRANULOCYTES NFR BLD AUTO: 1.3 % — HIGH (ref 0.1–0.3)
KETONES UR-MCNC: NEGATIVE — SIGNIFICANT CHANGE UP
L&D DRUG SCREEN, URINE: SIGNIFICANT CHANGE UP
LEUKOCYTE ESTERASE UR-ACNC: NEGATIVE — SIGNIFICANT CHANGE UP
LYMPHOCYTES # BLD AUTO: 1.63 K/UL — SIGNIFICANT CHANGE UP (ref 1.2–3.4)
LYMPHOCYTES # BLD AUTO: 20.9 % — SIGNIFICANT CHANGE UP (ref 20.5–51.1)
MCHC RBC-ENTMCNC: 30.9 PG — SIGNIFICANT CHANGE UP (ref 27–31)
MCHC RBC-ENTMCNC: 34.2 G/DL — SIGNIFICANT CHANGE UP (ref 32–37)
MCV RBC AUTO: 90.2 FL — SIGNIFICANT CHANGE UP (ref 81–99)
METHADONE UR-MCNC: NEGATIVE — SIGNIFICANT CHANGE UP
MONOCYTES # BLD AUTO: 0.41 K/UL — SIGNIFICANT CHANGE UP (ref 0.1–0.6)
MONOCYTES NFR BLD AUTO: 5.3 % — SIGNIFICANT CHANGE UP (ref 1.7–9.3)
NEUTROPHILS # BLD AUTO: 5.49 K/UL — SIGNIFICANT CHANGE UP (ref 1.4–6.5)
NEUTROPHILS NFR BLD AUTO: 70.4 % — SIGNIFICANT CHANGE UP (ref 42.2–75.2)
NITRITE UR-MCNC: NEGATIVE — SIGNIFICANT CHANGE UP
NRBC # BLD: 0 /100 WBCS — SIGNIFICANT CHANGE UP (ref 0–0)
OPIATES UR-MCNC: NEGATIVE — SIGNIFICANT CHANGE UP
OXYCODONE UR-MCNC: NEGATIVE — SIGNIFICANT CHANGE UP
PCP UR-MCNC: NEGATIVE — SIGNIFICANT CHANGE UP
PH UR: 7 — SIGNIFICANT CHANGE UP (ref 5–8)
PLATELET # BLD AUTO: 212 K/UL — SIGNIFICANT CHANGE UP (ref 130–400)
PRENATAL SYPHILIS TEST: SIGNIFICANT CHANGE UP
PROPOXYPHENE QUALITATIVE URINE RESULT: NEGATIVE — SIGNIFICANT CHANGE UP
PROT UR-MCNC: NEGATIVE — SIGNIFICANT CHANGE UP
RBC # BLD: 3.69 M/UL — LOW (ref 4.2–5.4)
RBC # FLD: 13.5 % — SIGNIFICANT CHANGE UP (ref 11.5–14.5)
SP GR SPEC: 1.01 — LOW (ref 1.01–1.03)
UROBILINOGEN FLD QL: SIGNIFICANT CHANGE UP
WBC # BLD: 7.79 K/UL — SIGNIFICANT CHANGE UP (ref 4.8–10.8)
WBC # FLD AUTO: 7.79 K/UL — SIGNIFICANT CHANGE UP (ref 4.8–10.8)

## 2021-05-27 PROCEDURE — 59510 CESAREAN DELIVERY: CPT | Mod: U9

## 2021-05-27 RX ORDER — GENTAMICIN SULFATE 40 MG/ML
80 VIAL (ML) INJECTION ONCE
Refills: 0 | Status: COMPLETED | OUTPATIENT
Start: 2021-05-27 | End: 2021-05-27

## 2021-05-27 RX ORDER — ACETAMINOPHEN 500 MG
1000 TABLET ORAL ONCE
Refills: 0 | Status: DISCONTINUED | OUTPATIENT
Start: 2021-05-27 | End: 2021-05-27

## 2021-05-27 RX ORDER — MORPHINE SULFATE 50 MG/1
0.2 CAPSULE, EXTENDED RELEASE ORAL ONCE
Refills: 0 | Status: DISCONTINUED | OUTPATIENT
Start: 2021-05-27 | End: 2021-05-27

## 2021-05-27 RX ORDER — HYDROMORPHONE HYDROCHLORIDE 2 MG/ML
0.5 INJECTION INTRAMUSCULAR; INTRAVENOUS; SUBCUTANEOUS
Refills: 0 | Status: DISCONTINUED | OUTPATIENT
Start: 2021-05-27 | End: 2021-05-27

## 2021-05-27 RX ORDER — IBUPROFEN 200 MG
600 TABLET ORAL EVERY 6 HOURS
Refills: 0 | Status: COMPLETED | OUTPATIENT
Start: 2021-05-27 | End: 2022-04-25

## 2021-05-27 RX ORDER — NALOXONE HYDROCHLORIDE 4 MG/.1ML
0.1 SPRAY NASAL
Refills: 0 | Status: DISCONTINUED | OUTPATIENT
Start: 2021-05-27 | End: 2021-05-27

## 2021-05-27 RX ORDER — MAGNESIUM HYDROXIDE 400 MG/1
30 TABLET, CHEWABLE ORAL
Refills: 0 | Status: DISCONTINUED | OUTPATIENT
Start: 2021-05-27 | End: 2021-05-29

## 2021-05-27 RX ORDER — DEXAMETHASONE 0.5 MG/5ML
4 ELIXIR ORAL EVERY 6 HOURS
Refills: 0 | Status: DISCONTINUED | OUTPATIENT
Start: 2021-05-27 | End: 2021-05-27

## 2021-05-27 RX ORDER — OXYTOCIN 10 UNIT/ML
41.67 VIAL (ML) INJECTION
Qty: 20 | Refills: 0 | Status: DISCONTINUED | OUTPATIENT
Start: 2021-05-27 | End: 2021-05-29

## 2021-05-27 RX ORDER — SODIUM CHLORIDE 9 MG/ML
1000 INJECTION, SOLUTION INTRAVENOUS ONCE
Refills: 0 | Status: COMPLETED | OUTPATIENT
Start: 2021-05-27 | End: 2021-05-27

## 2021-05-27 RX ORDER — LANOLIN
1 OINTMENT (GRAM) TOPICAL EVERY 6 HOURS
Refills: 0 | Status: DISCONTINUED | OUTPATIENT
Start: 2021-05-27 | End: 2021-05-29

## 2021-05-27 RX ORDER — CITRIC ACID/SODIUM CITRATE 300-500 MG
30 SOLUTION, ORAL ORAL ONCE
Refills: 0 | Status: COMPLETED | OUTPATIENT
Start: 2021-05-27 | End: 2021-05-27

## 2021-05-27 RX ORDER — DIPHENHYDRAMINE HCL 50 MG
25 CAPSULE ORAL EVERY 6 HOURS
Refills: 0 | Status: DISCONTINUED | OUTPATIENT
Start: 2021-05-27 | End: 2021-05-29

## 2021-05-27 RX ORDER — SODIUM CHLORIDE 9 MG/ML
1000 INJECTION, SOLUTION INTRAVENOUS
Refills: 0 | Status: DISCONTINUED | OUTPATIENT
Start: 2021-05-27 | End: 2021-05-27

## 2021-05-27 RX ORDER — DIPHENHYDRAMINE HCL 50 MG
25 CAPSULE ORAL EVERY 6 HOURS
Refills: 0 | Status: COMPLETED | OUTPATIENT
Start: 2021-05-27 | End: 2022-04-25

## 2021-05-27 RX ORDER — ONDANSETRON 8 MG/1
4 TABLET, FILM COATED ORAL EVERY 6 HOURS
Refills: 0 | Status: DISCONTINUED | OUTPATIENT
Start: 2021-05-27 | End: 2021-05-27

## 2021-05-27 RX ORDER — FAMOTIDINE 10 MG/ML
20 INJECTION INTRAVENOUS ONCE
Refills: 0 | Status: COMPLETED | OUTPATIENT
Start: 2021-05-27 | End: 2021-05-27

## 2021-05-27 RX ORDER — ACETAMINOPHEN 500 MG
975 TABLET ORAL
Refills: 0 | Status: DISCONTINUED | OUTPATIENT
Start: 2021-05-27 | End: 2021-05-29

## 2021-05-27 RX ORDER — SODIUM CHLORIDE 9 MG/ML
1000 INJECTION, SOLUTION INTRAVENOUS
Refills: 0 | Status: DISCONTINUED | OUTPATIENT
Start: 2021-05-27 | End: 2021-05-28

## 2021-05-27 RX ORDER — SIMETHICONE 80 MG/1
80 TABLET, CHEWABLE ORAL EVERY 4 HOURS
Refills: 0 | Status: DISCONTINUED | OUTPATIENT
Start: 2021-05-27 | End: 2021-05-29

## 2021-05-27 RX ORDER — OXYTOCIN 10 UNIT/ML
333.33 VIAL (ML) INJECTION
Qty: 20 | Refills: 0 | Status: DISCONTINUED | OUTPATIENT
Start: 2021-05-27 | End: 2021-05-27

## 2021-05-27 RX ORDER — ENOXAPARIN SODIUM 100 MG/ML
40 INJECTION SUBCUTANEOUS DAILY
Refills: 0 | Status: DISCONTINUED | OUTPATIENT
Start: 2021-05-28 | End: 2021-05-29

## 2021-05-27 RX ORDER — OXYCODONE HYDROCHLORIDE 5 MG/1
5 TABLET ORAL ONCE
Refills: 0 | Status: DISCONTINUED | OUTPATIENT
Start: 2021-05-27 | End: 2021-05-29

## 2021-05-27 RX ORDER — OXYCODONE HYDROCHLORIDE 5 MG/1
5 TABLET ORAL
Refills: 0 | Status: COMPLETED | OUTPATIENT
Start: 2021-05-27 | End: 2021-06-03

## 2021-05-27 RX ADMIN — Medication 104 MILLIGRAM(S): at 14:36

## 2021-05-27 RX ADMIN — SODIUM CHLORIDE 2000 MILLILITER(S): 9 INJECTION, SOLUTION INTRAVENOUS at 14:43

## 2021-05-27 RX ADMIN — Medication 975 MILLIGRAM(S): at 22:50

## 2021-05-27 RX ADMIN — Medication 30 MILLILITER(S): at 14:40

## 2021-05-27 RX ADMIN — Medication 975 MILLIGRAM(S): at 22:20

## 2021-05-27 RX ADMIN — SIMETHICONE 80 MILLIGRAM(S): 80 TABLET, CHEWABLE ORAL at 22:13

## 2021-05-27 RX ADMIN — Medication 100 MILLIGRAM(S): at 15:22

## 2021-05-27 RX ADMIN — FAMOTIDINE 20 MILLIGRAM(S): 10 INJECTION INTRAVENOUS at 14:37

## 2021-05-27 NOTE — OB PROVIDER H&P - NS_PARA_OBGYN_ALL_OB_NU
[Normal] : normal [LE] : 5/5 motor strength in bilateral lower extremities [] : Sensory: [L4-LT] : L4 [L5-LT] : L5 [1+] : left patella 1+ [2+] : left ankle jerk 2+ [DP] : dorsalis pedis 2+ and symmetric bilaterally [PT] : posterior tibial 2+ and symmetric bilaterally [Limited] : is limited [Poor Appearance] : well-appearing [Acute Distress] : not in acute distress [Obese] : not obese [Abl Mood] : in a normal mood [Abl Affect] : with normal affect [Poor Coordination] : normal coordination [Disorientation] : oriented x 3 [Painful] : not painful [SLR] : negative straight leg raise [Plantar Reflex Right Only] : absent on the right [Plantar Reflex Left Only] : absent on the left [DTR Reflexes Clonus Of Right Ankle (___ Beats)] : absent on the right 1 [DTR Reflexes Clonus Of Left Ankle (___ Beats)] : absent on the left [FreeTextEntry2] : The pt is awake, alert and oriented to self, place and time, is comfortable and in no acute distress. Gait examination reveals a narrow based, non-ataxic, non-antalgic gait. Can heel and toe walk without difficulty. Inspection of neck, back and lower extremities bilaterally reveals no rashes or ecchymotic lesions.  There is no obvious abnormal spinal curvature in the sagittal and coronal planes. There is no tenderness over the cervical, thoracic or lumbar spine, or the paraspinal or upper and lower extremities musculature. There is no sacroiliac tenderness. No greater trochanteric tenderness bilaterally. No atrophy or abnormal movements noted in the upper or lower extremities. There is no swelling noted in the upper or lower extremities bilaterally. No cervical lymphadenopathy noted anteriorly. No joint laxity noted in the upper and lower extremity joints bilaterally.\par  Hip range of motion is degrees internal rotation 30° external rotation without pain. Full range of motion of the shoulders bilaterally with no significant pain\par Negative straight leg raise to 45° in the sitting position bilaterally. There is no groin pain with hip internal rotation and a negative GERI test bilaterally.  [de-identified] : He can forward flex to his knees with increased left leg pain. Extension 30 no significant back [de-identified] : X-rays lumbar spine performed at [facility] radiology on June 5, 2017 demonstrate straightening of lumbar lordosis. Significant disc degeneration L5-S1 and to a lesser extent L1-2. No acute fractures\par \par X-rays of the hips bilaterally with pelvis on June 5, 2016 At [facility] demonstrated mild bilateral osteoarthritic Changes. No acute fractures.

## 2021-05-27 NOTE — PROGRESS NOTE ADULT - SUBJECTIVE AND OBJECTIVE BOX
JATINDER KING  35y  Female    PGY2 Note:    POD#0  Pt is recovering well, no acute complaints. Reports intermittent nausea with three episodes of vomiting. Pt denies headache, chest pain, SOB, fever, chills, nausea, vomiting, extremity pain or swelling. Pt denies headaches, vision changes, palpitations or RUQ pain. Pt denies palpitations, shortness of breath, dizziness, or syncope.     Ambulating: No, SCDs in place  Voiding: No, indwelling urinary catheter in place  Flatus: No  Bowel movements: No  Breast or bottle feeding: Both  Diet: Clear Liquid Diet    MEDICATIONS  (STANDING):  acetaminophen   Tablet .. 975 milliGRAM(s) Oral <User Schedule>  ibuprofen  Tablet. 600 milliGRAM(s) Oral every 6 hours  lactated ringers. 1000 milliLiter(s) (150 mL/Hr) IV Continuous <Continuous>  oxytocin Infusion 41.667 milliUNIT(s)/Min (125 mL/Hr) IV Continuous <Continuous>  simethicone 80 milliGRAM(s) Chew every 4 hours    MEDICATIONS  (PRN):  diphenhydrAMINE 25 milliGRAM(s) Oral every 6 hours PRN Pruritus  lanolin Ointment 1 Application(s) Topical every 6 hours PRN Sore Nipples  magnesium hydroxide Suspension 30 milliLiter(s) Oral two times a day PRN Constipation  oxyCODONE    IR 5 milliGRAM(s) Oral every 3 hours PRN Moderate to Severe Pain (4-10)  oxyCODONE    IR 5 milliGRAM(s) Oral once PRN Moderate to Severe Pain (4-10)      PAST MEDICAL & SURGICAL HISTORY:  Anemia  Ectopic pregnancy, unspecified location, unspecified whether intrauterine pregnancy present-tx with methotrexate  History of breast augmentation  S/P tonsillectomy  Born by  section-breech    Physical Exam  Vital Signs Last 24 Hrs  T(C): 36.7 (27 May 2021 20:16), Max: 36.7 (27 May 2021 13:35)  T(F): 98.1 (27 May 2021 20:16), Max: 98.1 (27 May 2021 20:16)  HR: 100 (27 May 2021 20:16) (70 - 110)  BP: 120/62 (27 May 2021 20:16) (101/45 - 138/65)  RR: 20 (27 May 2021 20:16) (18 - 20)  SpO2: 97% (27 May 2021 20:16) (81% - 98%)    UO: 100cc (from 3627-9078), adequate    Gen: AAOx3, NAD  Heart: RRR  Lungs: CTAB  Fundus: firm, below umbilicus   Wound: surgical dressing in place, dressing c/d/i   Abd: Soft, nontender, mildly distended, BS+  Lochia: minimal rubra  Ext: No calf tenderness, no swelling    Labs:                        11.4   7.79  )-----------( 212      ( 27 May 2021 14:25 )             33.3         JATINDER KING  35y  Female    PGY2 Note:    POD#0  Pt is recovering well, no acute complaints. Reports intermittent nausea with three episodes of vomiting. Pt denies headache, chest pain, SOB, fever, chills, nausea, vomiting, extremity pain or swelling. Pt denies headaches, vision changes, palpitations or RUQ pain. Pt denies palpitations, shortness of breath, dizziness, or syncope.     Ambulating: No, SCDs in place  Voiding: No, indwelling urinary catheter in place  Flatus: No  Bowel movements: No  Breast or bottle feeding: Both  Diet: Clear Liquid Diet    MEDICATIONS  (STANDING):  acetaminophen   Tablet .. 975 milliGRAM(s) Oral <User Schedule>  ibuprofen  Tablet. 600 milliGRAM(s) Oral every 6 hours  lactated ringers. 1000 milliLiter(s) (150 mL/Hr) IV Continuous <Continuous>  oxytocin Infusion 41.667 milliUNIT(s)/Min (125 mL/Hr) IV Continuous <Continuous>  simethicone 80 milliGRAM(s) Chew every 4 hours    MEDICATIONS  (PRN):  diphenhydrAMINE 25 milliGRAM(s) Oral every 6 hours PRN Pruritus  lanolin Ointment 1 Application(s) Topical every 6 hours PRN Sore Nipples  magnesium hydroxide Suspension 30 milliLiter(s) Oral two times a day PRN Constipation  oxyCODONE    IR 5 milliGRAM(s) Oral every 3 hours PRN Moderate to Severe Pain (4-10)  oxyCODONE    IR 5 milliGRAM(s) Oral once PRN Moderate to Severe Pain (4-10)      PAST MEDICAL & SURGICAL HISTORY:  Anemia  Ectopic pregnancy, unspecified location, unspecified whether intrauterine pregnancy present-tx with methotrexate  History of breast augmentation  S/P tonsillectomy  Born by  section-breech    Physical Exam  Vital Signs Last 24 Hrs  T(C): 36.7 (27 May 2021 20:16), Max: 36.7 (27 May 2021 13:35)  T(F): 98.1 (27 May 2021 20:16), Max: 98.1 (27 May 2021 20:16)  HR: 100 (27 May 2021 20:16) (70 - 110), manual HR-92bpm  BP: 120/62 (27 May 2021 20:16) (101/45 - 138/65)  RR: 20 (27 May 2021 20:16) (18 - 20)  SpO2: 97% (27 May 2021 20:16) (81% - 98%)    UO: 100cc (from 0140-5167), adequate    Gen: AAOx3, NAD  Heart: RRR  Lungs: CTAB  Fundus: firm, below umbilicus   Wound: surgical dressing in place, dressing c/d/i   Abd: Soft, nontender, mildly distended, BS+  Lochia: minimal rubra  Ext: No calf tenderness, no swelling    Labs:                        11.4   7.79  )-----------( 212      ( 27 May 2021 14:25 )             33.3

## 2021-05-27 NOTE — CHART NOTE - NSCHARTNOTEFT_GEN_A_CORE
PACU ANESTHESIA ADMISSION NOTE      Procedure:   Post op diagnosis:        __x__  Patent Airway    ____  Full return of protective reflexes    __x__  Full recovery from anesthesia / back to baseline     Vitals:   see anesthesia record      Mental Status:  __x__ Awake   __x___ Alert   _____ Drowsy   _____ Sedated    Nausea/Vomiting:  __x__ NO  ______Yes,   See Post - Op Orders          Pain Scale (0-10):  _____    Treatment: ____ None    ___x_ See Post - Op/PCA Orders    Post - Operative Fluids:   ____ Oral   __x__ See Post - Op Orders    Plan: Discharge:   ____Home       __x___Floor     _____Critical Care    _____  Other:_________________    Comments:  Uneventful intraoperative course. No anesthesia issues or complications noted. Patient stable upon arrival to PACU. Report given to RN. Discharge when criteria met.
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
## 2021-05-27 NOTE — OB PROVIDER H&P - NSHPLABSRESULTS_GEN_ALL_CORE
gct 100  HSV + in past on prophylactic valtrex 500mg BID gct 100  Varicella IgG + taking prophylactic valtrex 500mg BID

## 2021-05-27 NOTE — OB RN DELIVERY SUMMARY - NSSELHIDDEN_OBGYN_ALL_OB_FT
[NS_DeliveryAttending1_OBGYN_ALL_OB_FT:[id number]=],[NS_DeliveryRN_OBGYN_ALL_OB_FT:[email or web address]] [NS_DeliveryAttending1_OBGYN_ALL_OB_FT:[id number]],[NS_DeliveryRN_OBGYN_ALL_OB_FT:[email or web address]=],[NS_CirculateRN2_OBGYN_ALL_OB_FT:[id number]=]

## 2021-05-27 NOTE — BRIEF OPERATIVE NOTE - NSICDXBRIEFPOSTOP_GEN_ALL_CORE_FT
POST-OP DIAGNOSIS:  Term pregnancy 27-May-2021 17:50:02 39w1d Elis Blood  History of  section 27-May-2021 17:49:58  Elis Blood

## 2021-05-27 NOTE — OB RN INTRAOPERATIVE NOTE - NSSELHIDDEN_OBGYN_ALL_OB_FT
[NS_DeliveryAttending1_OBGYN_ALL_OB_FT:[id number]],[NS_DeliveryRN_OBGYN_ALL_OB_FT:[email or web address]] [NS_DeliveryAttending1_OBGYN_ALL_OB_FT:[id number]=],[NS_DeliveryRN_OBGYN_ALL_OB_FT:[email or web address]=],[NS_CirculateRN2_OBGYN_ALL_OB_FT:[id number]=]

## 2021-05-27 NOTE — OB PROVIDER H&P - ASSESSMENT
35y G 3Z1002 @39.1  weeks, previous c/s for repeat  section  Admit to Labor & delivery  IV hydration  prophylactic antibiotics  admission labs  NPO  EFM & TOCO monitoring  analgesia prn

## 2021-05-27 NOTE — BRIEF OPERATIVE NOTE - OPERATION/FINDINGS
Term male infant delivered in vertex presentation, Apgars 9/9, weighing 9lb3oz  Clear amniotic fluid  Normal bilateral fallopian tubes and ovaries

## 2021-05-27 NOTE — BRIEF OPERATIVE NOTE - NSICDXBRIEFPREOP_GEN_ALL_CORE_FT
PRE-OP DIAGNOSIS:  History of  section 27-May-2021 17:49:46  Elis Blood  Term pregnancy 27-May-2021 17:49:24 39w1d Elis Blood

## 2021-05-27 NOTE — OB RN DELIVERY SUMMARY - NS_GENERALBABYACOMMENTA_OBGYN_ALL_OB_FT
Baby was brought to well baby nursery with peds, to bed admitted and evaluated.  Then to return to L&D when mom goes to recovery

## 2021-05-27 NOTE — OB RN PATIENT PROFILE - FAMILY HISTORY
Mother  Still living? Unknown  Family history of MS (multiple sclerosis), Age at diagnosis: Age Unknown

## 2021-05-27 NOTE — PROCEDURAL SAFETY CHECKLIST WITH OR WITHOUT SEDATION - NSPREIMAGEREVIEW_GEN_ALL_CORE
Detail Level: Detailed Size Of Lesion In Cm (Optional): 0 not applicable Body Location Override (Optional - Billing Will Still Be Based On Selected Body Map Location If Applicable): left lateral upper back Body Location Override (Optional - Billing Will Still Be Based On Selected Body Map Location If Applicable): left proximal pretibial Body Location Override (Optional - Billing Will Still Be Based On Selected Body Map Location If Applicable): right ring finger

## 2021-05-27 NOTE — OB PROVIDER H&P - NSHPPHYSICALEXAM_GEN_ALL_CORE
PHYSICAL EXAM:  T(F): 98 (05-27 @ 13:35)  HR: 83 (05-27 @ 13:44)  BP: 119/61 (05-27 @ 13:44)  RR: 18 (05-27 @ 13:35)  Constitutional: AAOx3, NAD  Abdomen: Soft, gravid, nontender, no palpable ctx

## 2021-05-27 NOTE — OB PROVIDER DELIVERY SUMMARY - NSSELHIDDEN_OBGYN_ALL_OB_FT
[NS_DeliveryAttending1_OBGYN_ALL_OB_FT:[id number]=],[NS_DeliveryRN_OBGYN_ALL_OB_FT:[email or web address]=],[NS_CirculateRN2_OBGYN_ALL_OB_FT:[id number]=],[NS_DeliveryAssist1_OBGYN_ALL_OB_FT:[id number]=]

## 2021-05-28 ENCOUNTER — TRANSCRIPTION ENCOUNTER (OUTPATIENT)
Age: 36
End: 2021-05-28

## 2021-05-28 LAB
BASOPHILS # BLD AUTO: 0.04 K/UL — SIGNIFICANT CHANGE UP (ref 0–0.2)
BASOPHILS NFR BLD AUTO: 0.4 % — SIGNIFICANT CHANGE UP (ref 0–1)
COVID-19 SPIKE DOMAIN AB INTERP: NEGATIVE — SIGNIFICANT CHANGE UP
COVID-19 SPIKE DOMAIN ANTIBODY RESULT: 0.4 U/ML — SIGNIFICANT CHANGE UP
EOSINOPHIL # BLD AUTO: 0.07 K/UL — SIGNIFICANT CHANGE UP (ref 0–0.7)
EOSINOPHIL NFR BLD AUTO: 0.8 % — SIGNIFICANT CHANGE UP (ref 0–8)
HCT VFR BLD CALC: 32.2 % — LOW (ref 37–47)
HGB BLD-MCNC: 10.8 G/DL — LOW (ref 12–16)
IMM GRANULOCYTES NFR BLD AUTO: 0.9 % — HIGH (ref 0.1–0.3)
LYMPHOCYTES # BLD AUTO: 1.39 K/UL — SIGNIFICANT CHANGE UP (ref 1.2–3.4)
LYMPHOCYTES # BLD AUTO: 15.5 % — LOW (ref 20.5–51.1)
MCHC RBC-ENTMCNC: 30.9 PG — SIGNIFICANT CHANGE UP (ref 27–31)
MCHC RBC-ENTMCNC: 33.5 G/DL — SIGNIFICANT CHANGE UP (ref 32–37)
MCV RBC AUTO: 92 FL — SIGNIFICANT CHANGE UP (ref 81–99)
MONOCYTES # BLD AUTO: 0.53 K/UL — SIGNIFICANT CHANGE UP (ref 0.1–0.6)
MONOCYTES NFR BLD AUTO: 5.9 % — SIGNIFICANT CHANGE UP (ref 1.7–9.3)
NEUTROPHILS # BLD AUTO: 6.83 K/UL — HIGH (ref 1.4–6.5)
NEUTROPHILS NFR BLD AUTO: 76.5 % — HIGH (ref 42.2–75.2)
NRBC # BLD: 0 /100 WBCS — SIGNIFICANT CHANGE UP (ref 0–0)
PLATELET # BLD AUTO: 165 K/UL — SIGNIFICANT CHANGE UP (ref 130–400)
RBC # BLD: 3.5 M/UL — LOW (ref 4.2–5.4)
RBC # FLD: 13.6 % — SIGNIFICANT CHANGE UP (ref 11.5–14.5)
SARS-COV-2 IGG+IGM SERPL QL IA: 0.4 U/ML — SIGNIFICANT CHANGE UP
SARS-COV-2 IGG+IGM SERPL QL IA: NEGATIVE — SIGNIFICANT CHANGE UP
WBC # BLD: 8.94 K/UL — SIGNIFICANT CHANGE UP (ref 4.8–10.8)
WBC # FLD AUTO: 8.94 K/UL — SIGNIFICANT CHANGE UP (ref 4.8–10.8)

## 2021-05-28 RX ORDER — IBUPROFEN 200 MG
600 TABLET ORAL EVERY 6 HOURS
Refills: 0 | Status: DISCONTINUED | OUTPATIENT
Start: 2021-05-28 | End: 2021-05-29

## 2021-05-28 RX ORDER — OXYCODONE HYDROCHLORIDE 5 MG/1
5 TABLET ORAL
Refills: 0 | Status: DISCONTINUED | OUTPATIENT
Start: 2021-05-28 | End: 2021-05-29

## 2021-05-28 RX ADMIN — SIMETHICONE 80 MILLIGRAM(S): 80 TABLET, CHEWABLE ORAL at 17:42

## 2021-05-28 RX ADMIN — Medication 975 MILLIGRAM(S): at 08:54

## 2021-05-28 RX ADMIN — Medication 975 MILLIGRAM(S): at 03:54

## 2021-05-28 RX ADMIN — OXYCODONE HYDROCHLORIDE 5 MILLIGRAM(S): 5 TABLET ORAL at 01:43

## 2021-05-28 RX ADMIN — OXYCODONE HYDROCHLORIDE 5 MILLIGRAM(S): 5 TABLET ORAL at 20:19

## 2021-05-28 RX ADMIN — Medication 600 MILLIGRAM(S): at 18:14

## 2021-05-28 RX ADMIN — SIMETHICONE 80 MILLIGRAM(S): 80 TABLET, CHEWABLE ORAL at 23:09

## 2021-05-28 RX ADMIN — SIMETHICONE 80 MILLIGRAM(S): 80 TABLET, CHEWABLE ORAL at 01:44

## 2021-05-28 RX ADMIN — Medication 25 MILLIGRAM(S): at 04:01

## 2021-05-28 RX ADMIN — SIMETHICONE 80 MILLIGRAM(S): 80 TABLET, CHEWABLE ORAL at 14:15

## 2021-05-28 RX ADMIN — Medication 975 MILLIGRAM(S): at 14:45

## 2021-05-28 RX ADMIN — Medication 600 MILLIGRAM(S): at 17:41

## 2021-05-28 RX ADMIN — Medication 600 MILLIGRAM(S): at 12:03

## 2021-05-28 RX ADMIN — Medication 600 MILLIGRAM(S): at 23:30

## 2021-05-28 RX ADMIN — ENOXAPARIN SODIUM 40 MILLIGRAM(S): 100 INJECTION SUBCUTANEOUS at 12:03

## 2021-05-28 RX ADMIN — Medication 975 MILLIGRAM(S): at 14:15

## 2021-05-28 RX ADMIN — OXYCODONE HYDROCHLORIDE 5 MILLIGRAM(S): 5 TABLET ORAL at 02:13

## 2021-05-28 RX ADMIN — OXYCODONE HYDROCHLORIDE 5 MILLIGRAM(S): 5 TABLET ORAL at 20:45

## 2021-05-28 RX ADMIN — SIMETHICONE 80 MILLIGRAM(S): 80 TABLET, CHEWABLE ORAL at 05:54

## 2021-05-28 RX ADMIN — Medication 975 MILLIGRAM(S): at 09:24

## 2021-05-28 RX ADMIN — Medication 600 MILLIGRAM(S): at 05:53

## 2021-05-28 RX ADMIN — Medication 600 MILLIGRAM(S): at 12:33

## 2021-05-28 RX ADMIN — Medication 975 MILLIGRAM(S): at 04:24

## 2021-05-28 RX ADMIN — SIMETHICONE 80 MILLIGRAM(S): 80 TABLET, CHEWABLE ORAL at 10:22

## 2021-05-28 RX ADMIN — Medication 600 MILLIGRAM(S): at 23:09

## 2021-05-28 NOTE — DISCHARGE NOTE OB - CARE PROVIDERS DIRECT ADDRESSES
[email or web address]

## 2021-05-28 NOTE — DISCHARGE NOTE OB - PATIENT PORTAL LINK FT
You can access the FollowMyHealth Patient Portal offered by [facility] by registering at the following website: [email or web address]. By joining [employer]’s FollowMyHealth portal, you will also be able to view your health information using other applications (apps) compatible with our system.

## 2021-05-28 NOTE — DISCHARGE NOTE OB - PLAN OF CARE
Healthy and happy mom and baby Nothing in the vagina for 6 weeks. No tampons, no douching, no sex. No swimming, no tub-baths. May shower.  If fever 100.4F or greater, excessive vaginal bleeding, or severe abdominal pain, call your Ob/Gyn or come to ED or call 911.  Please see your doctor in 1 week for incision check and in 6 weeks for your regular PP visit.

## 2021-05-28 NOTE — PROGRESS NOTE ADULT - SUBJECTIVE AND OBJECTIVE BOX
PGY 2 Note:    Pt doing well, pain well controlled. No acute complaints. Denies fever, chills, N/V, CP, SOB, severe abdominal pain or heavy vaginal bleeding, dysuria.  Patient is ambulating, tolerating clear liquid diet, breast feeding without difficulty.   She has not passed flatus, and thomas catheter remains in place.     PAST MEDICAL & SURGICAL HISTORY:  Anemia    Ectopic pregnancy, unspecified location, unspecified whether intrauterine pregnancy present  tx with methotrexate    History of breast augmentation    S/P tonsillectomy    Born by  section  breech        Physical Exam  Vital Signs Last 24 Hrs  T(F): 97.3 (28 May 2021 03:50), Max: 98.1 (27 May 2021 20:16)  HR: 77 (28 May 2021 03:50) (70 - 110)  BP: 114/55 (28 May 2021 03:50) (101/45 - 138/65)  RR: 18 (28 May 2021 05:57) (18 - 20)    UO: (6317-5671) 700cc    Gen: AAOx3, NAD  Heart: S1S2, RRR  Lungs: CTABL  Abd: Soft, nontender, mildly distended, BS+  Incision: Dressing removed, no saturation. Steri strips in place, pfannenstiel incision c/d/i, no erythema/induration/drainage.  Fundus: Firm, nontender, below the umbilicus  Lochia: Normal  Ext: No calf tenderness, no swelling, SCDs in place    Labs:                        11.4   7.79  )-----------( 212      ( 27 May 2021 14:25 )             33.3     MEDICATIONS  (STANDING):  acetaminophen   Tablet .. 975 milliGRAM(s) Oral <User Schedule>  enoxaparin Injectable 40 milliGRAM(s) SubCutaneous daily  ibuprofen  Tablet. 600 milliGRAM(s) Oral every 6 hours  lactated ringers. 1000 milliLiter(s) (150 mL/Hr) IV Continuous <Continuous>  oxytocin Infusion 41.667 milliUNIT(s)/Min (125 mL/Hr) IV Continuous <Continuous>  simethicone 80 milliGRAM(s) Chew every 4 hours

## 2021-05-28 NOTE — DISCHARGE NOTE OB - CARE PLAN
Principal Discharge DX:	 delivery delivered  Goal:	Healthy and happy mom and baby  Assessment and plan of treatment:	Nothing in the vagina for 6 weeks. No tampons, no douching, no sex. No swimming, no tub-baths. May shower.  If fever 100.4F or greater, excessive vaginal bleeding, or severe abdominal pain, call your Ob/Gyn or come to ED or call 911.  Please see your doctor in 1 week for incision check and in 6 weeks for your regular PP visit.

## 2021-05-28 NOTE — DISCHARGE NOTE OB - CARE PROVIDER_API CALL
Hiro Hernandez  Obstetrics and Gynecology  [address] [place] [address]  Phone: [phone]  Fax: [phone]  Follow Up Time: 1 week

## 2021-05-29 VITALS
SYSTOLIC BLOOD PRESSURE: 125 MMHG | DIASTOLIC BLOOD PRESSURE: 72 MMHG | HEART RATE: 84 BPM | TEMPERATURE: 97 F | RESPIRATION RATE: 19 BRPM

## 2021-05-29 RX ORDER — ACETAMINOPHEN 500 MG
3 TABLET ORAL
Qty: 0 | Refills: 0 | DISCHARGE
Start: 2021-05-29

## 2021-05-29 RX ORDER — IBUPROFEN 200 MG
1 TABLET ORAL
Qty: 0 | Refills: 0 | DISCHARGE
Start: 2021-05-29

## 2021-05-29 RX ADMIN — ENOXAPARIN SODIUM 40 MILLIGRAM(S): 100 INJECTION SUBCUTANEOUS at 11:27

## 2021-05-29 RX ADMIN — Medication 975 MILLIGRAM(S): at 09:15

## 2021-05-29 RX ADMIN — SIMETHICONE 80 MILLIGRAM(S): 80 TABLET, CHEWABLE ORAL at 14:09

## 2021-05-29 RX ADMIN — Medication 600 MILLIGRAM(S): at 11:27

## 2021-05-29 RX ADMIN — SIMETHICONE 80 MILLIGRAM(S): 80 TABLET, CHEWABLE ORAL at 06:56

## 2021-05-29 RX ADMIN — SIMETHICONE 80 MILLIGRAM(S): 80 TABLET, CHEWABLE ORAL at 04:06

## 2021-05-29 RX ADMIN — MAGNESIUM HYDROXIDE 30 MILLILITER(S): 400 TABLET, CHEWABLE ORAL at 08:31

## 2021-05-29 RX ADMIN — Medication 975 MILLIGRAM(S): at 14:09

## 2021-05-29 RX ADMIN — Medication 975 MILLIGRAM(S): at 15:04

## 2021-05-29 RX ADMIN — Medication 975 MILLIGRAM(S): at 08:27

## 2021-05-29 RX ADMIN — Medication 600 MILLIGRAM(S): at 12:00

## 2021-05-29 RX ADMIN — Medication 600 MILLIGRAM(S): at 06:54

## 2021-05-29 RX ADMIN — SIMETHICONE 80 MILLIGRAM(S): 80 TABLET, CHEWABLE ORAL at 11:27

## 2021-05-29 RX ADMIN — Medication 975 MILLIGRAM(S): at 03:33

## 2021-05-29 NOTE — PROGRESS NOTE ADULT - ASSESSMENT
A/P: 34yo now P2 POD#1 s/p repeat C/S (#2) with an EBL of 750cc, recovering well   - encourage ambulation  - encourage PO hydration  - encourage incentive spirometry use  - monitor vitals, bleeding   - simethicone ordered standing   - DVT ppx: lovenox & SCDs  - diet: clear liquid diet. Will advance diet once flatus occurs  - thomas catheter to be removed at Duramorph time (0650)  - continue IVF hydration until successful trial of void  - pain mgmt prn   - f/u AM CBC     Dr. Begum aware. Dr. Hernandez to be made aware
36 yo P2 s/p repeat C/S, POD#2, recovering well   - continue routine pp care  - encourage ambulation, PO hydration and incentive spirometry use  - monitor vitals, bleeding   - simethicone ordered standing   - DVT ppx: lovenox & SCDs  - reg diet  - pain mgmt prn     Dr. Banks and Dr. Hernandez to be made aware   
A/P: 34yo now P2 POD#0 s/p repeat C/S (#2) with an EBL of 750cc, recovering well   - encourage ambulation  - encourage PO hydration  - encourage incentive spirometry use  - monitor vitals, bleeding   - simethicone & senna   - DVT ppx: lovenox & SCDs  - diet: clear liquid diet. Will advance diet once flatus occurs  - continue indwelling urinary catheter until the morning. Will consider removal in the AM if UO is adequate    - continue IVF hydration until successful trial of void  - pain mgmt prn   - f/u AM CBC     Dr. Mares aware. Dr. Hernandez to be made aware

## 2021-05-29 NOTE — PROGRESS NOTE ADULT - SUBJECTIVE AND OBJECTIVE BOX
Status post repeat  section day # 1 1/2  VS stsble.  No compliants passing rectal flatus and has had small bowel movement; no belching; urinating well without complaints  Tolerating regular diet. Ambulating well. No complaitns of heavy bleeding , lightheadedness.  PE;abdomen soft, minimally distended and minimal tympany; incision healing well      lochia very light      no calf tenderness    Imp; status post rcs day # 1 1/2 dong well  plan; continue present management ; regular diet; encouraged ambulation          possible discharge home later today or tomorrow

## 2021-05-29 NOTE — PROGRESS NOTE ADULT - SUBJECTIVE AND OBJECTIVE BOX
CC: postpartum    HPI: Patient evaluated at bedside this morning on POD#2, resting comfortable in bed. She reports pain is well controlled. She has been ambulating without assistance, voiding spontaneously, passing gas, tolerating regular diet. Had a bowel movement. She denies headache, dizziness, chest pain, palpitations, shortness of breath, nausea, vomiting, or heavy vaginal bleeding.    Physical Exam:  Vital Signs Last 24 Hrs  T(C): 36.3 (29 May 2021 07:40), Max: 36.6 (28 May 2021 15:45)  T(F): 97.3 (29 May 2021 07:40), Max: 97.9 (28 May 2021 15:45)  HR: 84 (29 May 2021 07:40) (76 - 95)  BP: 125/72 (29 May 2021 07:40) (105/58 - 133/66)  RR: 19 (29 May 2021 07:40) (18 - 19)    GA: comfortable in NAD  CV: normal s1s2  Lungs: CTAB  Abd: BS+, soft, nontender, nondistended, no rebound or guarding, Pfannenstiel skin incision clean, dry and intact, uterus firm at midline, fundus below umbilicus  : lochia WNL  Extremities: no swelling or calf tenderness                             10.8   8.94  )-----------( 165      ( 28 May 2021 05:43 )             32.2

## 2021-06-02 ENCOUNTER — APPOINTMENT (OUTPATIENT)
Dept: OBGYN | Facility: CLINIC | Age: 36
End: 2021-06-02

## 2021-06-07 DIAGNOSIS — O34.211 MATERNAL CARE FOR LOW TRANSVERSE SCAR FROM PREVIOUS CESAREAN DELIVERY: ICD-10-CM

## 2021-06-07 DIAGNOSIS — Z3A.39 39 WEEKS GESTATION OF PREGNANCY: ICD-10-CM

## 2021-06-10 ENCOUNTER — APPOINTMENT (OUTPATIENT)
Dept: OBGYN | Facility: CLINIC | Age: 36
End: 2021-06-10
Payer: COMMERCIAL

## 2021-06-10 VITALS — WEIGHT: 159 LBS | TEMPERATURE: 97.7 F | BODY MASS INDEX: 33.23 KG/M2

## 2021-06-10 PROCEDURE — 99024 POSTOP FOLLOW-UP VISIT: CPT

## 2021-07-01 ENCOUNTER — APPOINTMENT (OUTPATIENT)
Dept: OBGYN | Facility: CLINIC | Age: 36
End: 2021-07-01
Payer: COMMERCIAL

## 2021-07-01 VITALS — HEIGHT: 59 IN | BODY MASS INDEX: 32.05 KG/M2 | WEIGHT: 159 LBS | TEMPERATURE: 98 F

## 2021-07-01 PROCEDURE — 0503F POSTPARTUM CARE VISIT: CPT

## 2021-07-22 ENCOUNTER — APPOINTMENT (OUTPATIENT)
Dept: OBGYN | Facility: CLINIC | Age: 36
End: 2021-07-22

## 2021-07-22 VITALS — TEMPERATURE: 97.9 F | BODY MASS INDEX: 30.84 KG/M2 | HEIGHT: 59 IN | WEIGHT: 153 LBS

## 2021-07-26 ENCOUNTER — APPOINTMENT (OUTPATIENT)
Dept: OBGYN | Facility: CLINIC | Age: 36
End: 2021-07-26
Payer: COMMERCIAL

## 2021-07-26 ENCOUNTER — NON-APPOINTMENT (OUTPATIENT)
Age: 36
End: 2021-07-26

## 2021-07-26 VITALS — BODY MASS INDEX: 30.84 KG/M2 | HEIGHT: 59 IN | TEMPERATURE: 97.9 F | WEIGHT: 153 LBS

## 2021-07-26 PROCEDURE — 76830 TRANSVAGINAL US NON-OB: CPT

## 2021-07-26 PROCEDURE — 58300 INSERT INTRAUTERINE DEVICE: CPT

## 2021-07-26 PROCEDURE — 99072 ADDL SUPL MATRL&STAF TM PHE: CPT

## 2021-07-28 NOTE — OB RN DELIVERY SUMMARY - NS_ROMTYPE_OBGYN_ALL_OB
Consults     Rectal Bleeding   The current episode started yesterday. The onset was sudden. The problem has been rapidly improving. The pain is severe. The stool is described as bloody and liquid. Associated symptoms include anorexia, abdominal pain, diarrhea, nausea and vomiting. Pertinent negatives include no fever, no chest pain, no headaches and no coughing. Patient has a history of recurrent episodes of abdominal pain. She has had small bowel obstruction, colitis and possible diverticulitis in the past.  Last September she has a colonoscopy down for ongoing diarrhea. Biopsies were done extensively and were normal.  She has had a colonoscopy which did show colitis in  by biopsy.     Past Medical History:   Diagnosis Date    Anxiety     Asthma     Cerebral artery occlusion with cerebral infarction [facility])     Cerebrovascular disease     stroke     Depression     Diverticulitis     Fibromyalgia     History of small bowel obstruction     Hypertension     [facility]upus ([facility] [address]     Seizures ([facility] [address]  and      Past Surgical History:   Procedure Laterality Date    APPENDECTOMY       SECTION      x 4    COLONOSCOPY      nl    COLONOSCOPY  2015    1 bx    COLONOSCOPY N/A 2020    COLONOSCOPY performed by Nikky Morris MD at [place] [address]  2009    TAHBSO due to fibroids MMR    OTHER SURGICAL HISTORY Left 2016    C6 TFE    MO EGD TRANSORAL BIOPSY SINGLE/MULTIPLE N/A 2017    EGD BIOPSY performed by Nikky Morris MD at [address] [facility]  3/1/16    16 removed on bottom 1 on top     TUBAL LIGATION       Current Facility-Administered Medications   Medication Dose Route Frequency Provider Last Rate Last Admin    sodium chloride flush 0.9 % injection 10 mL  10 mL Intravenous 2 times per day Jacqualin Albe        sodium chloride flush 0.9 % injection 10 mL  10 mL Intravenous PRN Jacqualin Albe        0.9 % sodium chloride infusion  25 mL Intravenous PRN Mayte Scripture        acetaminophen (TYLENOL) tablet 650 mg  650 mg Oral Q4H PRN Mayte Scripture        [facility]) [facility] 4 mg  4 mg Intravenous Q6H PRN Mayte Scripture        metronidazole (FLAGYL) 500 mg in NaCl 100 mL IVPB premix  500 mg Intravenous Q8H Mayte Scripture   Stopped at 21 0710    0.9 % sodium chloride infusion   Intravenous Continuous Mayte Scripture 125 mL/hr at 21 2333 New Bag at 21 2333    metaxalone (SKELAXIN) tablet 800 mg  800 mg Oral TID Pinkie Arts Coley   800 mg at 21 3175    ketorolac (TORADOL) injection 30 mg  30 mg Intravenous Q6H PRN Mayte Scripture   30 mg at 21 2333    dicyclomine (BENTYL) capsule 10 mg  10 mg Oral 4x Daily PRN Mayte Scripture   10 mg at 21 2039    ciprofloxacin (CIPRO) IVPB 400 mg  400 mg Intravenous Q12H Mayte Scripture   Stopped at 21 0348    amLODIPine (NORVASC) tablet 10 mg  10 mg Oral Daily Mayte Scripture   10 mg at 21 1660     Allergies   Allergen Reactions    Latex Swelling     The powder in the gloves    Dilaudid [Hydromorphone Hcl] Nausea And Vomiting    Hydromorphone     Medrol [Methylprednisolone] Other (See Comments)     Back pain    Prednisone Nausea And Vomiting     Social History     Tobacco Use    Smoking status: Former Smoker     Packs/day: 0.25     Years: 10.00     Pack years: 2.50     Quit date: 1993     Years since quittin.2    Smokeless tobacco: Never Used    Tobacco comment: alecia rrt 3/12/17   Vaping Use    Vaping Use: Never used   Substance Use Topics    Alcohol use: Yes     Alcohol/week: 0.0 standard drinks     Comment: has about 1 or 2 a year.  very rarely    Drug use: No     Family History   Problem Relation Age of Onset    Diabetes Mother         Passed due to DM at 62    Other Mother         enlarged heart    Dementia Father     Other Father         Brain tumor     Diabetes Maternal Aunt     Diabetes Maternal Uncle     Glaucoma Neg Hx      Review of Systems   Constitutional: Negative for activity change, appetite change, fever and unexpected weight change. HENT: Negative for sore throat, trouble swallowing and voice change. Respiratory: Negative for cough, choking, chest tightness and shortness of breath. Cardiovascular: Negative for chest pain. Gastrointestinal: Positive for abdominal pain, anorexia, diarrhea, hematochezia, nausea and vomiting. Musculoskeletal: Negative for back pain. Neurological: Negative for dizziness, seizures, light-headedness, numbness and headaches. Hematological: Does not bruise/bleed easily. /73   Pulse 91   Temp 97.3 °F (36.3 °C) (Tympanic)   Resp 22   Ht 5' (1.524 m)   Wt 184 lb 3.2 oz (83.6 kg)   SpO2 95%   BMI 35.97 kg/m²     Physical Exam  Constitutional:       General: She is not in acute distress. Appearance: She is obese. She is not ill-appearing or diaphoretic. Eyes:      General: No scleral icterus. Cardiovascular:      Rate and Rhythm: Normal rate and regular rhythm. Heart sounds: Normal heart sounds. Pulmonary:      Breath sounds: Normal breath sounds. Abdominal:      General: A surgical scar is present. There is no distension or abdominal bruit. Palpations: Abdomen is soft. There is no hepatomegaly, splenomegaly, mass or pulsatile mass. Tenderness: There is no abdominal tenderness. Hernia: No hernia is present. There is no hernia in the umbilical area, ventral area, left inguinal area or right inguinal area. Neurological:      Mental Status: She is alert. CT results  Colonic wall thickening involving the splenic flexure and descending colon   consistent with an infectious or inflammatory colitis.  Associated reactive   mesenteric adenopathy.       Uncomplicated diverticulosis of the sigmoid colon. Reviewing the CT this is more consistent with colitis than diverticulitis.     Her H&H is similar to what it was 3 weeks ago. IMP/PLAN  1) Acute Colitis with Bleeding - Given the abrupt onset and CT findings I am suspicious this represents ischemic colitis. - I recommend repeating her colonoscopy even though she had one less than 1 year ago. - She is agreeable. AROM

## 2021-08-13 NOTE — DISCHARGE NOTE OB - STAPLES WILL BE REMOVED AS INSTRUCTED BY YOUR HEALTHCARE PROVIDER
[facility]   Obstetrics and Gynecology   History & Physical    2021    Patient: Candace Mustafa          MR#:[id number]    Chief complaint:  Here for scheduled CS     Subjective     37 y.o. female  at 37w2d who presents for scheduled  section with previous  section complicated by a uterine window.  The patient reports no contractions and regular fetal movement.  The patient was seen by maternal-fetal medicine for new findings of fetal hydronephrosis and recommended that  section be performed between 37 and 38 weeks with the prior uterine window.    The patient received late  steroids in anticipation of a 37-week delivery.      Patient Active Problem List   Diagnosis   • Supervision of other normal pregnancy, antepartum   • Rubella non-immune status, antepartum   • Previous  section   • AMA (advanced maternal age) multigravida 35+   • Genetic screening   • Marginal placenta previa   • History of Dehiscence of old uterine scar with extension before onset of labor during third trimester of pregnancy   • Fetal hydronephrosis during pregnancy, antepartum   • Obesity in pregnancy, antepartum   • Pregnancy       Past Medical History:   Diagnosis Date   • Anemia    • Dehiscence of old uterine scar with extension before onset of labor during third trimester of pregnancy 3/31/2021       Past Surgical History:   Procedure Laterality Date   •  SECTION Bilateral 2016    Procedure:  SECTION PRIMARY;  Surgeon: Ann Mitchell MD;  Location: [facility] LABOR DELIVERY;  Service:    •  SECTION N/A 2017    Procedure:  SECTION REPEAT;  Surgeon: Stew Castellanos MD;  Location: [facility] LABOR DELIVERY;  Service:    • DILATATION AND CURETTAGE     • WISDOM TOOTH EXTRACTION         Obstetric History:  OB History        7    Para   4    Term   4       0    AB   2    Living   4       SAB   2    TAB   0    Ectopic   0     Molar        Multiple   0    Live Births   4          Obstetric Comments   2021 7w5d Dating US:  Estimated Date of Delivery: 21 based on US hb   2021 27w0d normal interval growth: EFW 68% AC 71% hb, placenta posterior with resolved previa hb   2021 35w0d normal interval growth: EFW 72% AC 91%, BPP 8/8., vtx th in TRAY but no defect  hb               Menstrual History:     Patient's last menstrual period was 2020 (exact date).       # 1 - Date: 12, Sex: None, Weight: None, GA: 9w0d, Delivery: None, Apgar1: None, Apgar5: None, Living: None, Birth Comments: with D&C    # 2 - Date: 01/15/13, Sex: Female, Weight: 3685 g (8 lb 2 oz), GA: 40w0d, Delivery: Vaginal, Spontaneous, Apgar1: 8, Apgar5: 9, Living: Living, Birth Comments: no [id number]    # 3 - Date: 14, Sex: Female, Weight: 3827 g (8 lb 7 oz), GA: 38w1d, Delivery: Vaginal, Spontaneous, Apgar1: 7, Apgar5: 9, Living: Living, Birth Comments: Shoulder dystocia    # 4 - Date: 16, Sex: Male, Weight: 4255 g (9 lb 6.1 oz), GA: 39w2d, Delivery: , Low Transverse, Apgar1: 8, Apgar5: 8, Living: Living, Birth Comments: No complications JKB    # 5 - Date: 17, Sex: Male, Weight: 3535 g (7 lb 12.7 oz), GA: 39w0d, Delivery: , Low Transverse, Apgar1: 8, Apgar5: 9, Living: Living, Birth Comments: None    # 6 - Date: 10/2020, Sex: None, Weight: None, GA: None, Delivery: None, Apgar1: None, Apgar5: None, Living: None, Birth Comments: None    # 7 - Date: None, Sex: None, Weight: None, GA: None, Delivery: None, Apgar1: None, Apgar5: None, Living: None, Birth Comments: None      Prenatal Information:  Prenatal Results     POC Urine Glucose/Protein     Test Value Reference Range Date Time    Urine Glucose  Negative mg/dL Negative, 1000 mg/dL (3+) 21 1001    Urine Protein  Negative mg/dL Negative 21 1001          Initial Prenatal Labs     Test Value Reference Range Date Time    Hemoglobin  13.2 g/dL  11.1 - 15.9 01/18/21 1346    Hematocrit  39.1 % 34.0 - 46.6 01/18/21 1346    Platelets  205 10*3/mm3 140 - 450 08/13/21 0941       253 x10E3/uL 150 - 450 01/18/21 1346    Rubella IgG  0.92 index Immune >0.99 01/18/21 1346    Hepatitis B SAg  Negative  Negative 01/18/21 1346    Hepatitis C Ab  <0.1 s/co ratio 0.0 - 0.9 01/18/21 1346    RPR  Non Reactive  Non Reactive 01/18/21 1346    ABO  A   08/13/21 0941    Rh  Positive   08/13/21 0941    Antibody Screen  Negative  Negative 01/18/21 1346    HIV  Non Reactive  Non Reactive 01/18/21 1346    Urine Culture  Final report   01/18/21 1356    Gonorrhea  Negative  Negative 01/18/21 1357    Chlamydia  Negative  Negative 01/18/21 1357    TSH              2nd and 3rd Trimester     Test Value Reference Range Date Time    Hemoglobin (repeated)  10.2 g/dL 12.0 - 15.9 08/13/21 0941       11.6 g/dL 12.0 - 15.9 06/02/21 0853    Hematocrit (repeated)  30.9 % 34.0 - 46.6 08/13/21 0941       34.2 % 34.0 - 46.6 06/02/21 0853    GCT  94 mg/dL 65 - 139 06/02/21 0853    Antibody Screen (repeated)  Negative   08/13/21 0941    GTT Fasting        GTT 1 Hr ^ 95   02/16/16     GTT 2 Hr        GTT 3 Hr        Group B Strep  Positive  Negative 08/04/21 1108          Drug Screening     Test Value Reference Range Date Time    Amphetamine Screen        Barbiturate Screen        Benzodiazepine Screen        Methadone Screen        Phencyclidine Screen        Opiates Screen        THC Screen        Cocaine Screen        Propoxyphene Screen        Buprenorphine Screen        Methamphetamine Screen        Oxycodone Screen        Tricyclic Antidepressants Screen              Other (Risk screening)     Test Value Reference Range Date Time    Varicella IgG  1,437 index Immune >165 01/18/21 1346    Parvovirus IgG        CMV IgG        Cystic Fibrosis        Hemoglobin electrophoresis        NIPT        MSAFP-4        AFP (for NTD only) ^ declined   03/31/21           Legend    ^: Historical                       External Prenatal Results     Pregnancy Outside Results - Transcribed From Office Records - See Scanned Records For Details     Test Value Date Time    ABO  A  08/13/21 0941    Rh  Positive  08/13/21 0941    Antibody Screen  Negative  08/13/21 0941       Negative  01/18/21 1346    Varicella IgG  1,437 index 01/18/21 1346    Rubella  0.92 index 01/18/21 1346    Hgb  10.2 g/dL 08/13/21 0941       11.6 g/dL 06/02/21 0853       13.2 g/dL 01/18/21 1346    Hct  30.9 % 08/13/21 0941       34.2 % 06/02/21 0853       39.1 % 01/18/21 1346    Glucose Fasting GTT       Glucose Tolerance Test 1 hour ^ 95  02/16/16     Glucose Tolerance Test 3 hour       Gonorrhea (discrete)  Negative  01/18/21 1357    Chlamydia (discrete)  Negative  01/18/21 1357    RPR  Non Reactive  01/18/21 1346    VDRL       Syphilis Antibody       HBsAg  Negative  01/18/21 1346    Herpes Simplex Virus PCR       Herpes Simplex VIrus Culture       HIV  Non Reactive  01/18/21 1346    Hep C RNA Quant PCR       Hep C Antibody  <0.1 s/co ratio 01/18/21 1346    AFP       Group B Strep  Positive  08/04/21 1108    GBS Susceptibility to Clindamycin       GBS Susceptibility to Erythromycin       Fetal Fibronectin       Genetic Testing, Maternal Blood ^ declined  01/13/17           Drug Screening     Test Value Date Time    Urine Drug Screen       Amphetamine Screen       Barbiturate Screen       Benzodiazepine Screen       Methadone Screen       Phencyclidine Screen       Opiates Screen       THC Screen       Cocaine Screen       Propoxyphene Screen       Buprenorphine Screen       Methamphetamine Screen       Oxycodone Screen       Tricyclic Antidepressants Screen             Legend    ^: Historical                          Family History   Problem Relation Age of Onset   • Breast cancer Mother 54   • No Known Problems Father    • No Known Problems Brother    • No Known Problems Son    • No Known Problems Daughter    • Throat cancer Paternal Grandfather    • No  Known Problems Paternal Grandmother    • Dementia Maternal Grandmother    • No Known Problems Maternal Grandfather    • No Known Problems Daughter    • No Known Problems Son        Social History     Tobacco Use   • Smoking status: Never Smoker   • Smokeless tobacco: Never Used   Vaping Use   • Vaping Use: Never used   Substance Use Topics   • Alcohol use: Not Currently     Comment: occasional   • Drug use: No       Sulfa antibiotics      Current Facility-Administered Medications:   •  carboprost (HEMABATE) injection 250 mcg, 250 mcg, Intramuscular, Q15 Min PRN, Stew Castellanos MD  •  erythromycin (ROMYCIN) 5 MG/GM ophthalmic ointment  - ADS Override Pull, , , ,   •  famotidine (PEPCID) injection 20 mg, 20 mg, Intravenous, BID, Stew Castellanos MD, 20 mg at 08/13/21 1125  •  lactated ringers infusion, 125 mL/hr, Intravenous, Continuous, Stew Castellanos MD, Last Rate: 125 mL/hr at 08/13/21 1037, Restarted at 08/13/21 1152  •  lidocaine PF 1% (XYLOCAINE) injection 5 mL, 5 mL, Intradermal, PRN, Stew Castellanos MD  •  methylergonovine (METHERGINE) injection 200 mcg, 200 mcg, Intramuscular, Once PRN, Stew Castellanos MD  •  miSOPROStol (CYTOTEC) tablet 800 mcg, 800 mcg, Rectal, Once PRN, Stwe Castellanos MD  •  [COMPLETED] oxytocin in sodium chloride (PITOCIN) 30 UNIT/500ML infusion solution, 999 mL/hr, Intravenous, Once, Last Rate: 250 mL/hr at 08/13/21 1226, 250 mL/hr at 08/13/21 1226 **FOLLOWED BY** oxytocin in sodium chloride (PITOCIN) 30 UNIT/500ML infusion solution, 250 mL/hr, Intravenous, Continuous PRN **FOLLOWED BY** oxytocin in sodium chloride (PITOCIN) 30 UNIT/500ML infusion solution, 125 mL/hr, Intravenous, Continuous PRN, Stew Castellanos MD  •  phytonadione (VITAMIN K) 1 MG/0.5ML injection  - ADS Override Pull, , , ,   •  sodium chloride 0.9 % flush 10 mL, 10 mL, Intravenous, Q12H, Stew Castellanos MD  •  sodium chloride 0.9 % flush 10 mL, 10 mL, Intravenous, PRN, Stew Castellanos MD    Facility-Administered  Medications Ordered in Other Encounters:   •  ondansetron (ZOFRAN) injection, , Intravenous, PRN, Lyubov Nguyenine OMAIRA CRNA, 4 mg at 21 1213  •  phenylephrine (HERIBERTO-SYNEPHRINE) injection, , Intravenous, PRN, Lyubov Nguyenine OMAIRA CRNA, 100 mcg at 21 1216    Review of Systems  Review of Systems   Constitutional: Negative.    Respiratory: Negative.    Cardiovascular: Negative.    Gastrointestinal: Negative.    Genitourinary: Negative.    Psychiatric/Behavioral: Negative.        Objective     Vital Signs  Temp:  [97.9 °F (36.6 °C)] 97.9 °F (36.6 °C)  Heart Rate:  [83] 83  Resp:  [18] 18  BP: (111)/(67) 111/67    Physical Exam:  Physical Exam  Vitals and nursing note reviewed.   Constitutional:       General: She is not in acute distress.     Appearance: Normal appearance. She is well-developed. She is not ill-appearing.   HENT:      Head: Normocephalic.   Pulmonary:      Effort: Pulmonary effort is normal.   Abdominal:      General: Abdomen is flat. There is no distension.      Palpations: Abdomen is soft. There is no mass.      Tenderness: There is no abdominal tenderness.   Genitourinary:     Vagina: Normal.   Musculoskeletal:         General: No swelling or tenderness.      Comments: No calf tenderness or swelling    Neurological:      Mental Status: She is alert and oriented to person, place, and time.   Psychiatric:         Behavior: Behavior normal.         Thought Content: Thought content normal.         Judgment: Judgment normal.         Labs:  Results from last 7 days   Lab Units 21  0941   WBC 10*3/mm3 10.61   HEMOGLOBIN g/dL 10.2*   HEMATOCRIT % 30.9*   PLATELETS 10*3/mm3 205         Hospital problem list:    Previous  section    Pregnancy      Assessment/Plan     1. Intrauterine pregnancy at 37w2d    2.  Previous uterine window at time of  section    3.  Severe left fetal hydronephrosis      Plan:   delivery at 37 weeks      Stew Castellanos MD  21  12:39  EDT      Patient Care Team:  Provider, No Known as PCP - Mandy Matute PA as Physician Assistant (Obstetrics and Gynecology)         Statement Selected

## 2021-09-09 ENCOUNTER — APPOINTMENT (OUTPATIENT)
Dept: OBGYN | Facility: CLINIC | Age: 36
End: 2021-09-09
Payer: COMMERCIAL

## 2021-09-09 PROCEDURE — 76830 TRANSVAGINAL US NON-OB: CPT

## 2021-09-09 PROCEDURE — 93975 VASCULAR STUDY: CPT

## 2021-09-10 NOTE — OB PROVIDER DELIVERY SUMMARY - NSATTENDATTESTATION_OBGYN_A_OB
[facility]  GI CONSULT NOTE    REQUESTING PROVIDER:  Briana Vogt PA-C    CHIEF COMPLAINT:  Consultation       SUBJECTIVE:    Irena Galvez is a 42 year old female seen today at the request of Briana Vogt PA-C for rectal pain.    Patient is a 42-year-old female with past medical history of HLD, hypothyroid, fibromyalgia and seizure disorder who presents in consultation with report of rectal pain that had began several months earlier following a bout of constipation after eating a large volume of cheese curds. The pain was present at rest and during bowel movements. Was not getting relief with sitz baths and OTC cream so presented to the urgent care on 08/17/2021   Rectal exam revealed 1 small external hemorrhoid without any bleeding.  CT pelvis was done which showed no acute abnormality.  There is a small fat containing umbilical hernia.  Pelvic exam was positive for yeast infection and was treated.    Today the patient reports that her rectal pain continues and is quite debilitating.  Occurs typically after her 1st bowel movement in the a.m. and last the entire day .  It improves when she is laying flat and does not wake up with the pain.  Typically has 1 soft bowel movement in the morning after drinking coffee .  She is noticing bright red blood around the stool , in the toilet and on the toilet paper with severe rectal pain during and following defecation.  She does feel a rather large protruding area at the rectum during this time.  Also feels pain on the inside of the vagina also restricting her use of tampons during menstruation.  She denies any other symptoms of fever, chills, nausea, vomiting, heartburn, reflux, dysphagia.  Her appetite and weight are stable.  No abdominal pain.  She has not had a colonoscopy.  She does have a family history of colon cancer in a maternal grandmother.      MEDICATIONS:       Current Outpatient Medications   Medication Sig Dispense Refill   •  atorvastatin (LIPITOR) 10 MG tablet Take 1 tablet by mouth daily. 30 tablet 3   • zolpidem (AMBIEN) 10 MG tablet TAKE ONE TABLET BY MOUTH EVERY NIGHT AT BEDTIME 30 tablet 3   • fenofibrate (TRICOR) 145 MG tablet TAKE ONE TABLET BY MOUTH DAILY 90 tablet 0   • levothyroxine 200 MCG tablet Take 1 tablet by mouth daily. 90 tablet 3   • ibuprofen (MOTRIN) 200 MG tablet Take 200 mg by mouth every 6 hours as needed for Pain.     • Multiple Vitamins-Minerals (MULTIVITAMIN PO) Take by mouth daily.     • POTASSIUM CHLORIDE PO Take by mouth daily. (OTC med- dose unknown)     • DISPENSE Walker with seat, folds up with breaks 1 Device 0   • Cholecalciferol (VITAMIN D PO) Take 800 mg by mouth daily.      • Ascorbic Acid (VITAMIN C PO) Take by mouth daily.      • lidocaine-nifedipine 5-0.2 % in hydrocortisone 2.5 % ointment Apply pea sized amount to hemorrhoid or fissure twice daily. 30 g 1   • Na Sulfate-K Sulfate-Mg Sulf 17.5-3.13-1.6 GM/177ML Solution Mix in drink 177 ml at 5 pm the night before procedure. Repeat 5 hours prior to procedure. 1 kit 0   • miconazole (Miconazole 7) 2 % vaginal cream Place 1 applicator vaginally nightly. 45 g 0     No current facility-administered medications for this visit.       PROBLEM LIST:                  Patient Active Problem List   Diagnosis   • Intervertebral cervical disc disorder with myelopathy, cervical region   • Degeneration of lumbar or lumbosacral intervertebral disc   • Multiple sclerosis (CMS/HCC)   • Hypothyroid   • HLD (hyperlipidemia)   • Fibromyalgia   • Neuropathy of left lower extremity   • Tremors of nervous system   • Abnormality of gait and mobility   • Blurry vision, left eye   • Other viral warts   • Dysarthria   • Left-sided weakness   • Faintness   • CRPS (complex regional pain syndrome)   • Conversion disorder   • Right-sided chest wall pain   • Right shoulder pain       HISTORIES:    ALLERGIES:  No Known Allergies  Past Medical History:   Diagnosis Date   •  Arthritis    • Chronic pain    • Fibromyalgia    • HLD (hyperlipidemia)    • Hypothyroid    • Insomnia    • Memory loss     slight forgetfullness, due to fibromyalgia, seizure disorder   • Seizures (CMS/HCC)     last seizure was  (were \"mini\" seizures)     Past Surgical History:   Procedure Laterality Date   • D and c      X 2 - one in  and the other was after that   • Destruc,vulva lesion(s),simple  2019    Sonopet to vulvar and anal warts, Cj   • Laser vaporization  2017    laser vaporization of warts    • Thyroidectomy       due to \"thyroid storm\"   • Tubal ligation  10/2010   • Vaginal delivery  x4     Social History     Tobacco Use   • Smoking status: Former Smoker     Packs/day: 0.25     Years: 12.00     Pack years: 3.00     Types: Cigarettes     Start date:      Quit date: 2009     Years since quittin.8   • Smokeless tobacco: Never Used   Substance Use Topics   • Alcohol use: Yes     Alcohol/week: 0.0 standard drinks     Comment: socially- infrequently   • Drug use: No        Family History   Problem Relation Age of Onset   • Asthma Mother    • Heart disease Mother    • Thyroid Mother    • Psychiatric Mother    • Diabetes Father    • High blood pressure Father    • High cholesterol Father    • Stroke Maternal Grandmother    • Cancer Maternal Grandmother         Colon, older than 50   • Asthma Brother    • Heart disease Brother    • Thyroid Brother    • Cancer Maternal Grandfather         Skin CA on his lip (from smoking), surgically removed   • Cancer Paternal Grandfather         Prostate   :    REVIEW OF SYSTEMS:    All other systems are reviewed and are negative except as documented in the history of present illness.    PHYSICAL EXAM:    Vital Signs: Blood pressure 120/70, pulse 98, weight 83.5 kg, last menstrual period 2021, SpO2 96 %.  General: The patient is well developed, well nourished, in no acute distress, appears stated age.   Neurologic: Alert.  Normal mood and affect, normal speech, no gross tremor.  Skin: Warm and dry, normal turgor.  Head: Normocephalic.  Eyes: Normal conjunctivae and sclerae.  Pupils equal, round, reactive to light.  Extraocular movements intact.  HEENT: Oropharynx clear, moist mucous membranes, external nose is normal to inspection.  Neck: Symmetric without swelling or tenderness.   Respiratory: Respiratory effort normal.   Cardiovascular: Regular rate and rhythm.  Extremities: No swelling or tenderness.  Gastrointestinal:  Soft and nontender.  Normal bowel sounds.  No hepatomegaly or splenomegaly.    Rectal:  External inspection with normal anal tissue other than a small somewhat thrombosed 3 mm internal hemorrhoid.  Pain to palpation.  No bleeding.  ANAHI deferred has anal fissure suspected.    LABORATORY DATA:    Lab Results   Component Value Date    WBC 9.6 08/17/2021    HCT 43.1 08/17/2021    HGB 14.7 08/17/2021     08/17/2021     Lab Results   Component Value Date    GLUCOSE 99 08/17/2021    SODIUM 137 08/17/2021    POTASSIUM 4.1 08/17/2021    CHLORIDE 107 08/17/2021    BUN 12 08/17/2021    CREATININE 0.70 08/17/2021    CALCIUM 9.4 08/17/2021    ALBUMIN 3.7 10/18/2020    AST 13 10/18/2020    ALKPT 54 10/18/2020    GPT 22 10/18/2020    ANIONGAP 12 08/17/2021    BCRAT 17 08/17/2021    GLOB 2.7 09/16/2019    AGR 1.6 09/16/2019     TSH (mcUnits/mL)   Date Value   09/16/2019 <0.008 (L)       IMAGING STUDIES:    IMPRESSION:     No acute pelvic abnormality.     Small fat-containing umbilical hernia.     ENDOSCOPY:  None    ASSESSMENT:     42-year-old female who presents with rectal pain, suspicious for anal fissure as the pain is present most of the day and worsens after defecation.  She has not responded to nifedipine cream given to her in the ED nor OTC medication. There is a very small internal hemorrhoid noted right at the anal verge, however she does have a significant amount of bleeding.  Plan to treat hemorrhoid/anal fissure  with compounded cream of nifedipine/hydrocortisone/lidocaine.  If no improvement, recommend colonoscopy for further evaluation of her bleeding and pain.    1. Rectal pain  - nifedipine/hydrocortisone/lidocaine cream to rectal area b.i.d.  - discontinue nitroglycerin cream  - MiraLax 1 cap full daily  - continue tox and Sitz baths  - call in 2 weeks with report  - colonoscopy    Irena was seen today for consultation.    Diagnoses and all orders for this visit:    Rectal or anal pain    Other orders  -     lidocaine-nifedipine 5-0.2 % in hydrocortisone 2.5 % ointment; Apply pea sized amount to hemorrhoid or fissure twice daily.        PLAN:     follow up with MD for procedure  Instructions provided as documented in the after visit summary.    The patient indicated understanding of the diagnosis and agreed with the plan of care.      A copy of this note was sent to the referring provider. Thank you for involving me   in the care of this patient.   I was physically present and participated in this delivery.

## 2021-12-02 NOTE — OB PROVIDER H&P - HISTORY OF PRESENT ILLNESS
Is This A New Presentation, Or A Follow-Up?: Growths
The pt is a 35y y/o G [id number] @39.1  weeks  edc  21 dated by  lmp confirmed by 6 week sonogram  who presents to labor & delivery  for repeat  section  Pt reports + fetal movement, no srom,  no vaginal bleeding, or regular contractions
How Severe Is Your Skin Lesion?: mild
Have Your Skin Lesions Been Treated?: not been treated

## 2021-12-15 DIAGNOSIS — Z86.19 PERSONAL HISTORY OF OTHER INFECTIOUS AND PARASITIC DISEASES: ICD-10-CM

## 2021-12-15 RX ORDER — VALACYCLOVIR 500 MG/1
500 TABLET, FILM COATED ORAL
Qty: 90 | Refills: 3 | Status: ACTIVE | COMMUNITY
Start: 2021-12-15 | End: 1900-01-01

## 2021-12-30 ENCOUNTER — NON-APPOINTMENT (OUTPATIENT)
Age: 36
End: 2021-12-30

## 2022-01-02 ENCOUNTER — LABORATORY RESULT (OUTPATIENT)
Age: 37
End: 2022-01-02

## 2022-01-03 ENCOUNTER — APPOINTMENT (OUTPATIENT)
Dept: OBGYN | Facility: CLINIC | Age: 37
End: 2022-01-03
Payer: COMMERCIAL

## 2022-01-03 VITALS — HEIGHT: 58 IN | WEIGHT: 149 LBS | BODY MASS INDEX: 31.28 KG/M2 | TEMPERATURE: 97.9 F

## 2022-01-03 PROCEDURE — 99212 OFFICE O/P EST SF 10 MIN: CPT

## 2022-01-05 ENCOUNTER — NON-APPOINTMENT (OUTPATIENT)
Age: 37
End: 2022-01-05

## 2022-01-23 ENCOUNTER — LABORATORY RESULT (OUTPATIENT)
Age: 37
End: 2022-01-23

## 2022-01-24 ENCOUNTER — APPOINTMENT (OUTPATIENT)
Dept: OBGYN | Facility: CLINIC | Age: 37
End: 2022-01-24
Payer: COMMERCIAL

## 2022-01-24 VITALS — TEMPERATURE: 97.8 F | WEIGHT: 147 LBS | HEIGHT: 58 IN | BODY MASS INDEX: 30.86 KG/M2

## 2022-01-24 PROCEDURE — 99395 PREV VISIT EST AGE 18-39: CPT

## 2022-07-23 ENCOUNTER — NON-APPOINTMENT (OUTPATIENT)
Age: 37
End: 2022-07-23

## 2022-08-01 ENCOUNTER — OUTPATIENT (OUTPATIENT)
Dept: OUTPATIENT SERVICES | Facility: HOSPITAL | Age: 37
LOS: 1 days | Discharge: HOME | End: 2022-08-01

## 2022-08-01 DIAGNOSIS — Z98.82 BREAST IMPLANT STATUS: Chronic | ICD-10-CM

## 2022-08-01 DIAGNOSIS — M79.89 OTHER SPECIFIED SOFT TISSUE DISORDERS: ICD-10-CM

## 2022-08-01 DIAGNOSIS — Z90.89 ACQUIRED ABSENCE OF OTHER ORGANS: Chronic | ICD-10-CM

## 2022-08-01 PROCEDURE — 93970 EXTREMITY STUDY: CPT | Mod: 26

## 2022-08-26 ENCOUNTER — APPOINTMENT (OUTPATIENT)
Dept: OBGYN | Facility: CLINIC | Age: 37
End: 2022-08-26

## 2022-08-26 VITALS — WEIGHT: 145 LBS | HEIGHT: 58 IN | BODY MASS INDEX: 30.44 KG/M2 | TEMPERATURE: 98 F

## 2022-08-26 LAB
BILIRUB UR QL STRIP: NEGATIVE
CLARITY UR: CLEAR
COLLECTION METHOD: NORMAL
GLUCOSE UR-MCNC: NEGATIVE
HCG UR QL: 0.2 EU/DL
HGB UR QL STRIP.AUTO: NEGATIVE
KETONES UR-MCNC: NEGATIVE
LEUKOCYTE ESTERASE UR QL STRIP: NEGATIVE
NITRITE UR QL STRIP: NEGATIVE
PH UR STRIP: 7
PROT UR STRIP-MCNC: NEGATIVE
SP GR UR STRIP: 1.01

## 2022-08-26 PROCEDURE — 81003 URINALYSIS AUTO W/O SCOPE: CPT | Mod: QW

## 2022-08-26 PROCEDURE — 99212 OFFICE O/P EST SF 10 MIN: CPT | Mod: 25

## 2022-08-26 PROCEDURE — 56420 I&D BARTHOLINS GLAND ABSCESS: CPT

## 2023-01-29 ENCOUNTER — LABORATORY RESULT (OUTPATIENT)
Age: 38
End: 2023-01-29

## 2023-01-30 ENCOUNTER — APPOINTMENT (OUTPATIENT)
Dept: OBGYN | Facility: CLINIC | Age: 38
End: 2023-01-30
Payer: COMMERCIAL

## 2023-01-30 VITALS — BODY MASS INDEX: 31.49 KG/M2 | TEMPERATURE: 98 F | HEIGHT: 58 IN | WEIGHT: 150 LBS

## 2023-01-30 DIAGNOSIS — Z97.5 PRESENCE OF (INTRAUTERINE) CONTRACEPTIVE DEVICE: ICD-10-CM

## 2023-01-30 DIAGNOSIS — Z01.419 ENCOUNTER FOR GYNECOLOGICAL EXAMINATION (GENERAL) (ROUTINE) W/OUT ABNORMAL FINDINGS: ICD-10-CM

## 2023-01-30 PROCEDURE — 99395 PREV VISIT EST AGE 18-39: CPT

## 2023-02-01 ENCOUNTER — APPOINTMENT (OUTPATIENT)
Dept: OBGYN | Facility: CLINIC | Age: 38
End: 2023-02-01

## 2023-02-10 NOTE — PROGRESS NOTE ADULT - SUBJECTIVE AND OBJECTIVE BOX
Status post repeat  section day # 1  Vs stsble; doing well.  OOB and ambulatory. voiding well since thomas out this am  tolerating clear liquid diet/ no complaints of heavy bleeding  PE; abdomen soft, non tender, minimal tympany       incision healing well       lochia light       no calf tenderness    imp: status post rcs doing well  plan; continue to encourage ambulation; advance diet to full liqueids with regular diet for dinner          probable discharge for tomorrow. 10-Feb-2023 09:00

## 2023-04-03 ENCOUNTER — NON-APPOINTMENT (OUTPATIENT)
Age: 38
End: 2023-04-03

## 2023-04-10 ENCOUNTER — NON-APPOINTMENT (OUTPATIENT)
Age: 38
End: 2023-04-10

## 2023-06-15 ENCOUNTER — NON-APPOINTMENT (OUTPATIENT)
Age: 38
End: 2023-06-15

## 2023-08-09 ENCOUNTER — APPOINTMENT (OUTPATIENT)
Dept: OBGYN | Facility: CLINIC | Age: 38
End: 2023-08-09
Payer: COMMERCIAL

## 2023-08-09 VITALS — BODY MASS INDEX: 28.34 KG/M2 | WEIGHT: 135 LBS | TEMPERATURE: 98.2 F | HEIGHT: 58 IN

## 2023-08-09 DIAGNOSIS — N90.7 VULVAR CYST: ICD-10-CM

## 2023-08-09 DIAGNOSIS — N76.2 ACUTE VULVITIS: ICD-10-CM

## 2023-08-09 DIAGNOSIS — O21.9 VOMITING OF PREGNANCY, UNSPECIFIED: ICD-10-CM

## 2023-08-09 DIAGNOSIS — N91.1 SECONDARY AMENORRHEA: ICD-10-CM

## 2023-08-09 DIAGNOSIS — Z30.430 ENCOUNTER FOR INSERTION OF INTRAUTERINE CONTRACEPTIVE DEVICE: ICD-10-CM

## 2023-08-09 PROCEDURE — 99213 OFFICE O/P EST LOW 20 MIN: CPT

## 2023-08-09 NOTE — PHYSICAL EXAM
[Appropriately responsive] : appropriately responsive [No Acute Distress] : no acute distress [Alert] : alert [Oriented x3] : oriented x3 [Normal] : normal [FreeTextEntry1] : Normal bilateral labia majora and left minora. Right labia minora with 1cm cyst with minimal fluctuance at the apex, spontaneously draining clear fluid. Manually expressed clear fluid with some purulent material, less than 1cc total. No bleeding. Some tenderness.

## 2023-08-09 NOTE — DISCUSSION/SUMMARY
[FreeTextEntry1] : 38 yo   with labial cyst - Discussed concept of labial cysts, warm compress is appropriate for maintaining egress of fluid, continue nightly warm compress for the next 2-3 days. - Discussed that if recurrent, can surgically remove if she wants to, would have to be done when exacerbated to get full resolution. Do not recommend at this time as it is nearly completely resolved. Also discussed that given its location at the apex of the vagina, removal would likely result in asymmetric appearance of the vulva. - Return for next annual or PRN. Call if symptoms return.

## 2023-09-06 ENCOUNTER — RESULT REVIEW (OUTPATIENT)
Age: 38
End: 2023-09-06

## 2023-09-06 ENCOUNTER — OUTPATIENT (OUTPATIENT)
Dept: OUTPATIENT SERVICES | Facility: HOSPITAL | Age: 38
LOS: 1 days | End: 2023-09-06
Payer: COMMERCIAL

## 2023-09-06 DIAGNOSIS — Z98.82 BREAST IMPLANT STATUS: Chronic | ICD-10-CM

## 2023-09-06 DIAGNOSIS — Z90.89 ACQUIRED ABSENCE OF OTHER ORGANS: Chronic | ICD-10-CM

## 2023-09-06 DIAGNOSIS — Z12.31 ENCOUNTER FOR SCREENING MAMMOGRAM FOR MALIGNANT NEOPLASM OF BREAST: ICD-10-CM

## 2023-09-06 PROCEDURE — 77067 SCR MAMMO BI INCL CAD: CPT | Mod: 26

## 2023-09-06 PROCEDURE — 77063 BREAST TOMOSYNTHESIS BI: CPT

## 2023-09-06 PROCEDURE — 77063 BREAST TOMOSYNTHESIS BI: CPT | Mod: 26

## 2023-09-06 PROCEDURE — 77067 SCR MAMMO BI INCL CAD: CPT

## 2023-09-07 DIAGNOSIS — Z12.31 ENCOUNTER FOR SCREENING MAMMOGRAM FOR MALIGNANT NEOPLASM OF BREAST: ICD-10-CM

## 2023-09-28 NOTE — PRE-ANESTHESIA EVALUATION ADULT - NS MD HP INPLANTS MED DEV
breast Spiral Flap Text: The defect edges were debeveled with a #15 scalpel blade. Given the location of the defect, shape of the defect and the proximity to free margins a spiral flap was deemed most appropriate. Using a sterile surgical marker, an appropriate rotation flap was drawn incorporating the defect and placing the expected incisions within the relaxed skin tension lines where possible. The area thus outlined was incised deep to adipose tissue with a #15 scalpel blade. The skin margins were undermined to an appropriate distance in all directions utilizing iris scissors. Following this, the designed flap was carried over into the primary defect and sutured into place.

## 2023-10-05 ENCOUNTER — NON-APPOINTMENT (OUTPATIENT)
Age: 38
End: 2023-10-05

## 2024-07-11 NOTE — OB RN PATIENT PROFILE - EDUCATION OF THE PLACEMENT OF INFANT DURING SKIN TO SKIN: THE INFANT IS TO BE PLACED BELLY DOWN DIRECTLY ON PARENT'S CHEST, POSITIONED WITH INFANT'S FACE TOWARD PARENT'S FACE, SO THE PARENT CAN OBSERVE THE INFANT’S COLOR AT ALL TIMES.
Psychiatric Psychiatric Psychiatric Psychiatric Psychiatric Psychiatric Psychiatric Psychiatric Psychiatric Psychiatric Psychiatric Psychiatric Psychiatric Psychiatric Psychiatric Statement Selected

## 2024-08-16 ENCOUNTER — NON-APPOINTMENT (OUTPATIENT)
Age: 39
End: 2024-08-16
